# Patient Record
Sex: FEMALE | Race: WHITE | NOT HISPANIC OR LATINO | Employment: FULL TIME | URBAN - METROPOLITAN AREA
[De-identification: names, ages, dates, MRNs, and addresses within clinical notes are randomized per-mention and may not be internally consistent; named-entity substitution may affect disease eponyms.]

---

## 2018-12-06 ENCOUNTER — HOSPITAL ENCOUNTER (EMERGENCY)
Facility: CLINIC | Age: 43
Discharge: HOME | End: 2018-12-06
Attending: EMERGENCY MEDICINE | Admitting: EMERGENCY MEDICINE
Payer: MEDICAID

## 2018-12-06 ENCOUNTER — APPOINTMENT (OUTPATIENT)
Dept: CT IMAGING | Facility: CLINIC | Age: 43
End: 2018-12-06
Attending: EMERGENCY MEDICINE
Payer: MEDICAID

## 2018-12-06 VITALS
DIASTOLIC BLOOD PRESSURE: 80 MMHG | RESPIRATION RATE: 18 BRPM | HEART RATE: 76 BPM | BODY MASS INDEX: 27.66 KG/M2 | WEIGHT: 166 LBS | SYSTOLIC BLOOD PRESSURE: 106 MMHG | OXYGEN SATURATION: 96 % | TEMPERATURE: 98.2 F | HEIGHT: 65 IN

## 2018-12-06 DIAGNOSIS — N83.202 LEFT OVARIAN CYST: ICD-10-CM

## 2018-12-06 DIAGNOSIS — R10.32 ABDOMINAL PAIN, LEFT LOWER QUADRANT: ICD-10-CM

## 2018-12-06 DIAGNOSIS — G44.219 EPISODIC TENSION-TYPE HEADACHE, NOT INTRACTABLE: ICD-10-CM

## 2018-12-06 LAB
ALBUMIN SERPL-MCNC: 2.8 G/DL (ref 3.4–5)
ALBUMIN UR-MCNC: NEGATIVE MG/DL
ALP SERPL-CCNC: 60 U/L (ref 40–150)
ALT SERPL W P-5'-P-CCNC: 50 U/L (ref 0–50)
ANION GAP SERPL CALCULATED.3IONS-SCNC: 4 MMOL/L (ref 3–14)
APPEARANCE UR: CLEAR
AST SERPL W P-5'-P-CCNC: 24 U/L (ref 0–45)
BASOPHILS # BLD AUTO: 0 10E9/L (ref 0–0.2)
BASOPHILS NFR BLD AUTO: 0.2 %
BILIRUB SERPL-MCNC: 0.2 MG/DL (ref 0.2–1.3)
BILIRUB UR QL STRIP: NEGATIVE
BUN SERPL-MCNC: 14 MG/DL (ref 7–30)
CALCIUM SERPL-MCNC: 8 MG/DL (ref 8.5–10.1)
CHLORIDE SERPL-SCNC: 105 MMOL/L (ref 94–109)
CO2 SERPL-SCNC: 31 MMOL/L (ref 20–32)
COLOR UR AUTO: YELLOW
CREAT SERPL-MCNC: 0.73 MG/DL (ref 0.52–1.04)
CRP SERPL-MCNC: <2.9 MG/L (ref 0–8)
DIFFERENTIAL METHOD BLD: NORMAL
EOSINOPHIL NFR BLD AUTO: 1.9 %
ERYTHROCYTE [DISTWIDTH] IN BLOOD BY AUTOMATED COUNT: 12.9 % (ref 10–15)
ERYTHROCYTE [SEDIMENTATION RATE] IN BLOOD BY WESTERGREN METHOD: 6 MM/H (ref 0–20)
GFR SERPL CREATININE-BSD FRML MDRD: 87 ML/MIN/1.7M2
GLUCOSE SERPL-MCNC: 92 MG/DL (ref 70–99)
GLUCOSE UR STRIP-MCNC: NEGATIVE MG/DL
HCG UR QL: NEGATIVE
HCT VFR BLD AUTO: 41.9 % (ref 35–47)
HGB BLD-MCNC: 13.7 G/DL (ref 11.7–15.7)
HGB UR QL STRIP: ABNORMAL
IMM GRANULOCYTES # BLD: 0 10E9/L (ref 0–0.4)
IMM GRANULOCYTES NFR BLD: 0.2 %
KETONES UR STRIP-MCNC: NEGATIVE MG/DL
LACTATE BLD-SCNC: 0.6 MMOL/L (ref 0.7–2)
LEUKOCYTE ESTERASE UR QL STRIP: NEGATIVE
LIPASE SERPL-CCNC: 130 U/L (ref 73–393)
LYMPHOCYTES # BLD AUTO: 2.3 10E9/L (ref 0.8–5.3)
LYMPHOCYTES NFR BLD AUTO: 35.7 %
MCH RBC QN AUTO: 30.3 PG (ref 26.5–33)
MCHC RBC AUTO-ENTMCNC: 32.7 G/DL (ref 31.5–36.5)
MCV RBC AUTO: 93 FL (ref 78–100)
MONOCYTES # BLD AUTO: 0.4 10E9/L (ref 0–1.3)
MONOCYTES NFR BLD AUTO: 6.5 %
MUCOUS THREADS #/AREA URNS LPF: PRESENT /LPF
NEUTROPHILS # BLD AUTO: 3.5 10E9/L (ref 1.6–8.3)
NEUTROPHILS NFR BLD AUTO: 55.5 %
NITRATE UR QL: NEGATIVE
NRBC # BLD AUTO: 0 10*3/UL
NRBC BLD AUTO-RTO: 0 /100
PH UR STRIP: 5 PH (ref 5–7)
PLATELET # BLD AUTO: 163 10E9/L (ref 150–450)
POTASSIUM SERPL-SCNC: 3.9 MMOL/L (ref 3.4–5.3)
PROT SERPL-MCNC: 5.8 G/DL (ref 6.8–8.8)
RBC # BLD AUTO: 4.52 10E12/L (ref 3.8–5.2)
RBC #/AREA URNS AUTO: 7 /HPF (ref 0–2)
SODIUM SERPL-SCNC: 140 MMOL/L (ref 133–144)
SOURCE: ABNORMAL
SP GR UR STRIP: 1.02 (ref 1–1.03)
SQUAMOUS #/AREA URNS AUTO: 1 /HPF (ref 0–1)
UROBILINOGEN UR STRIP-MCNC: 0 MG/DL (ref 0–2)
WBC # BLD AUTO: 6.3 10E9/L (ref 4–11)
WBC #/AREA URNS AUTO: 2 /HPF (ref 0–5)

## 2018-12-06 PROCEDURE — 85652 RBC SED RATE AUTOMATED: CPT | Performed by: EMERGENCY MEDICINE

## 2018-12-06 PROCEDURE — 99285 EMERGENCY DEPT VISIT HI MDM: CPT | Mod: 25 | Performed by: EMERGENCY MEDICINE

## 2018-12-06 PROCEDURE — 96361 HYDRATE IV INFUSION ADD-ON: CPT | Mod: 59 | Performed by: EMERGENCY MEDICINE

## 2018-12-06 PROCEDURE — 81025 URINE PREGNANCY TEST: CPT | Performed by: EMERGENCY MEDICINE

## 2018-12-06 PROCEDURE — 86140 C-REACTIVE PROTEIN: CPT | Performed by: EMERGENCY MEDICINE

## 2018-12-06 PROCEDURE — 25000128 H RX IP 250 OP 636: Performed by: EMERGENCY MEDICINE

## 2018-12-06 PROCEDURE — 96375 TX/PRO/DX INJ NEW DRUG ADDON: CPT | Performed by: EMERGENCY MEDICINE

## 2018-12-06 PROCEDURE — 81001 URINALYSIS AUTO W/SCOPE: CPT | Performed by: EMERGENCY MEDICINE

## 2018-12-06 PROCEDURE — 74177 CT ABD & PELVIS W/CONTRAST: CPT

## 2018-12-06 PROCEDURE — 83605 ASSAY OF LACTIC ACID: CPT | Performed by: EMERGENCY MEDICINE

## 2018-12-06 PROCEDURE — 36415 COLL VENOUS BLD VENIPUNCTURE: CPT | Performed by: EMERGENCY MEDICINE

## 2018-12-06 PROCEDURE — 25000125 ZZHC RX 250: Performed by: EMERGENCY MEDICINE

## 2018-12-06 PROCEDURE — 99284 EMERGENCY DEPT VISIT MOD MDM: CPT | Mod: Z6 | Performed by: EMERGENCY MEDICINE

## 2018-12-06 PROCEDURE — 96374 THER/PROPH/DIAG INJ IV PUSH: CPT | Mod: 59 | Performed by: EMERGENCY MEDICINE

## 2018-12-06 PROCEDURE — 80053 COMPREHEN METABOLIC PANEL: CPT | Performed by: EMERGENCY MEDICINE

## 2018-12-06 PROCEDURE — 85025 COMPLETE CBC W/AUTO DIFF WBC: CPT | Performed by: EMERGENCY MEDICINE

## 2018-12-06 PROCEDURE — 83690 ASSAY OF LIPASE: CPT | Performed by: EMERGENCY MEDICINE

## 2018-12-06 RX ORDER — ONDANSETRON 2 MG/ML
4 INJECTION INTRAMUSCULAR; INTRAVENOUS EVERY 30 MIN PRN
Status: DISCONTINUED | OUTPATIENT
Start: 2018-12-06 | End: 2018-12-07 | Stop reason: HOSPADM

## 2018-12-06 RX ORDER — IOPAMIDOL 755 MG/ML
100 INJECTION, SOLUTION INTRAVASCULAR ONCE
Status: COMPLETED | OUTPATIENT
Start: 2018-12-06 | End: 2018-12-06

## 2018-12-06 RX ORDER — SODIUM CHLORIDE 9 MG/ML
1000 INJECTION, SOLUTION INTRAVENOUS CONTINUOUS
Status: DISCONTINUED | OUTPATIENT
Start: 2018-12-06 | End: 2018-12-07 | Stop reason: HOSPADM

## 2018-12-06 RX ORDER — KETOROLAC TROMETHAMINE 30 MG/ML
30 INJECTION, SOLUTION INTRAMUSCULAR; INTRAVENOUS ONCE
Status: COMPLETED | OUTPATIENT
Start: 2018-12-06 | End: 2018-12-06

## 2018-12-06 RX ADMIN — ONDANSETRON HYDROCHLORIDE 4 MG: 2 INJECTION, SOLUTION INTRAMUSCULAR; INTRAVENOUS at 19:19

## 2018-12-06 RX ADMIN — KETOROLAC TROMETHAMINE 30 MG: 30 INJECTION, SOLUTION INTRAMUSCULAR at 19:35

## 2018-12-06 RX ADMIN — SODIUM CHLORIDE 1000 ML: 9 INJECTION, SOLUTION INTRAVENOUS at 19:51

## 2018-12-06 RX ADMIN — SODIUM CHLORIDE 60 ML: 9 INJECTION, SOLUTION INTRAVENOUS at 21:14

## 2018-12-06 RX ADMIN — IOPAMIDOL 81 ML: 755 INJECTION, SOLUTION INTRAVENOUS at 21:13

## 2018-12-06 ASSESSMENT — ENCOUNTER SYMPTOMS
HEADACHES: 1
ABDOMINAL PAIN: 1
BLOOD IN STOOL: 1
NAUSEA: 1

## 2018-12-06 NOTE — ED AVS SNAPSHOT
[facility] Emergency Department    [address] [place] [address]    Phone:  [phone]    Fax:  [phone]                                       Alexa Vee   MRN: [id number]    Department:  [facility] Emergency Department   Date of Visit:  12/6/2018           After Visit Summary Signature Page     I have received my discharge instructions, and my questions have been answered. I have discussed any challenges I see with this plan with the nurse or doctor.    ..........................................................................................................................................  Patient/Patient Representative Signature      ..........................................................................................................................................  Patient Representative Print Name and Relationship to Patient    ..................................................               ................................................  Date                                   Time    ..........................................................................................................................................  Reviewed by Signature/Title    ...................................................              ..............................................  Date                                               Time          22EPIC Rev 08/18

## 2018-12-06 NOTE — ED AVS SNAPSHOT
[facility] Emergency Department    [address] [place] [address]    Phone:  [phone]    Fax:  [phone]                                       Alexa Vee   MRN: [id number]    Department:  [facility] Emergency Department   Date of Visit:  12/6/2018           Patient Information     Date Of Birth          1975        Your diagnoses for this visit were:     Left ovarian cyst     Abdominal pain, left lower quadrant     Episodic tension-type headache, not intractable        You were seen by Jose David Tadeo MD.      Follow-up Information     Follow up with Jayro Mckinney MD.    Specialty:  Urology    Why:  As needed    Contact information:    [facility] UROLOGY  [address] [address]  [phone]          Discharge Instructions         Abdominal Pain    Abdominal pain is pain in the stomach or belly area. Everyone has this pain from time to time. In many cases it goes away on its own. But abdominal pain can sometimes be due to a serious problem, such as appendicitis. So it s important to know when to seek help.  Causes of abdominal pain  There are many possible causes of abdominal pain. Common causes in adults include:    Constipation, diarrhea, or gas    Stomach acid flowing back up into the esophagus (acid reflux or heartburn)    Severe acid reflux, called GERD (gastroesophageal reflux disease)    A sore in the lining of the stomach or small intestine (peptic ulcer)    Inflammation of the gallbladder, liver, or pancreas    Gallstones or kidney stones    Appendicitis     Intestinal blockage     An internal organ pushing through a muscle or other tissue (hernia)    Urinary tract infections    In women, menstrual cramps, fibroids, or endometriosis    Inflammation or infection of the intestines  Diagnosing the cause of abdominal pain  Your healthcare provider will do a physical exam help find the cause of your pain. If needed, tests will be  ordered. Belly pain has many possible causes. So it can be hard to find the reason for your pain. Giving details about your pain can help. Tell your provider where and when you feel the pain, and what makes it better or worse. Also let your provider know if you have other symptoms such as:    Fever    Tiredness    Upset stomach (nausea)    Vomiting    Changes in bathroom habits  Treating abdominal pain  Some causes of pain need emergency medical treatment right away. These include appendicitis or a bowel blockage. Other problems can be treated with rest, fluids, or medicines. Your healthcare provider can give you specific instructions for treatment or self-care based on what is causing your pain.  If you have vomiting or diarrhea, sip water or other clear fluids. When you are ready to eat solid foods again, start with small amounts of easy-to-digest, low-fat foods. These include apple sauce, toast, or crackers.   When to seek medical care  Call 911 or go to the hospital right away if you:    Can t pass stool and are vomiting    Are vomiting blood or have bloody diarrhea or black, tarry diarrhea    Have chest, neck, or shoulder pain    Feel like you might pass out    Have pain in your shoulder blades with nausea    Have sudden, severe belly pain    Have new, severe pain unlike any you have felt before    Have a belly that is rigid, hard, and tender to touch  Call your healthcare provider if you have:    Pain for more than 5 days    Bloating for more than 2 days    Diarrhea for more than 5 days    A fever of 100.4 F (38 C) or higher, or as directed by your healthcare provider    Pain that gets worse    Weight loss for no reason    Continued lack of appetite    Blood in your stool  How to prevent abdominal pain  Here are some tips to help prevent abdominal pain:    Eat smaller amounts of food at one time.    Avoid greasy, fried, or other high-fat foods.    Avoid foods that give you gas.    Exercise regularly.    Drink  plenty of fluids.  To help prevent GERD symptoms:    Quit smoking.    Reduce alcohol and certain foods that increase stomach acid.    Avoid aspirin and over-the-counter pain and fever medicines (NSAIDS or nonsteroidal anti-inflammatory drugs), if possible    Lose extra weight.    Finish eating at least 2 hours before you go to bed or lie down.    Raise the head of your bed.  Date Last Reviewed: 7/1/2016 2000-2018 [employer]. [address] [place] [address], [place] [address]. All rights reserved. This information is not intended as a substitute for professional medical care. Always follow your healthcare professional's instructions.          Discharge References/Attachments     OVARIAN CYSTS, UNDERSTANDING (ENGLISH)    OVARIAN CYSTS, TREATMENT FOR  (ENGLISH)    HEADACHE, TENSION (ENGLISH)      24 Hour Appointment Hotline       To make an appointment at any [facility] clinic, call [phone] ([phone]). If you don't have a family doctor or clinic, we will help you find one. [facility] clinics are conveniently located to serve the needs of you and your family.             Review of your medicines      Our records show that you are taking the medicines listed below. If these are incorrect, please call your family doctor or clinic.        Dose / Directions Last dose taken    venlafaxine 37.5 MG 24 hr capsule   Commonly known as:  EFFEXOR-XR   Dose:  37.5 mg        Take 37.5 mg by mouth daily   Refills:  0                Procedures and tests performed during your visit     CBC with platelets differential    CRP inflammation    CT Abdomen Pelvis w Contrast    Comprehensive metabolic panel    Erythrocyte sedimentation rate auto    Give 20 ounces of water 15 minutes before CT of abdomen    HCG qualitative urine    Lactic acid whole blood    Lipase    Peripheral IV catheter    UA reflex to Microscopic      Orders Needing Specimen Collection     None      Pending Results     Date and Time Order Name Status  "Description    2018 1858 CT Abdomen Pelvis w Contrast Preliminary             Pending Culture Results     No orders found from 2018 to 2018.            Pending Results Instructions     If you had any lab results that were not finalized at the time of your Discharge, you can call the ED Lab Result RN at [phone]. You will be contacted by this team for any positive Lab results or changes in treatment. The nurses are available 7 days a week from 10A to 6:30P.  You can leave a message 24 hours per day and they will return your call.        Thank you for choosing [facility]       Thank you for choosing [facility] for your care. Our goal is always to provide you with excellent care. Hearing back from our patients is one way we can continue to improve our services. Please take a few minutes to complete the written survey that you may receive in the mail after you visit with us. Thank you!        [employer] Information     [employer] lets you send messages to your doctor, view your test results, renew your prescriptions, schedule appointments and more. To sign up, go to [email or web address] . Click on \"Log in\" on the left side of the screen, which will take you to the Welcome page. Then click on \"Sign up Now\" on the right side of the page.     You will be asked to enter the access code listed below, as well as some personal information. Please follow the directions to create your username and password.     Your access code is: [email or web address]  Expires: 3/6/2019 10:01 PM     Your access code will  in 90 days. If you need help or a new code, please call your [facility] clinic or [phone].        Care EveryWhere ID     This is your Care EveryWhere ID. This could be used by other organizations to access your [facility] medical records  [id number]        Equal Access to Services     MARIANELA MATOS AH: carolin Martinez, edwin mendiola " ah. So [facility] [phone].    ATENCIÓN: Si habla español, tiene a arzate disposición servicios gratuitos de asistencia lingüística. Llame al [phone].    We comply with applicable federal civil rights laws and [place] laws. We do not discriminate on the basis of race, color, national origin, age, disability, sex, sexual orientation, or gender identity.            After Visit Summary       This is your record. Keep this with you and show to your community pharmacist(s) and doctor(s) at your next visit.

## 2018-12-07 NOTE — ED TRIAGE NOTES
Pt here with left lower quad pain, blood in her stool, nausea, and headache. She says she had recent cystoscopy that she did not do follow-up on.

## 2018-12-07 NOTE — ED PROVIDER NOTES
History     Chief Complaint   Patient presents with     Abdominal Pain     The history is provided by the patient, the spouse and medical records.     Alexa Vee is a 43 year old female who presents to the ED for evaluation of left lower quadrant pain, blood in her stools, nausea, headaches, and a multitude of other ailments.  Patient has been bouncing around different ERs over the last month for a variety of things including methamphetamine abuse, left lower quadrant abdominal pain, blood in her urine, blood in her stool, and has failed to show up to any of her follow-up appointments because she is without insurance currently.  Patient had a cystoscopy and August that showed no evidence for intracystic abnormalities.  At that time she also had a CT of the abdomen pelvis showing a 5 cm ovarian cyst and a ill-defined left adnexal mass.  At the time she had a negative pregnancy test and again she was lost to follow-up.    Problem List:    Patient Active Problem List    Diagnosis Date Noted     CARDIOVASCULAR SCREENING; LDL GOAL LESS THAN 130 06/26/2012     Priority: Medium        Past Medical History:    No past medical history on file.    Past Surgical History:    No past surgical history on file.    Family History:    Family History   Problem Relation Age of Onset     Cerebrovascular Disease Mother      Hypertension Mother      Cerebrovascular Disease Maternal Grandmother      Cerebrovascular Disease Maternal Grandfather      Hypertension Maternal Aunt        Social History:  Marital Status:   [4]  Social History   Substance Use Topics     Smoking status: Current Every Day Smoker     Packs/day: 0.50     Smokeless tobacco: Never Used     Alcohol use No        Medications:      venlafaxine (EFFEXOR-XR) 37.5 MG 24 hr capsule         Review of Systems   Gastrointestinal: Positive for abdominal pain, blood in stool and nausea.   Neurological: Positive for headaches.       Physical Exam   BP: 122/78  Pulse:  "76  Heart Rate: 76  Temp: 97.6  F (36.4  C)  Resp: 14  Height: 163.8 cm (5' 4.5\")  Weight: 75.3 kg (166 lb)  SpO2: 100 %      Physical Exam   Constitutional: She is oriented to person, place, and time. She appears well-developed. No distress.   HENT:   Head: Atraumatic.   Mouth/Throat: Oropharynx is clear and moist.   Eyes: EOM are normal. Pupils are equal, round, and reactive to light.   Neck: Normal range of motion. Neck supple.   Cardiovascular: Normal rate, normal heart sounds and intact distal pulses.    Pulmonary/Chest: Effort normal and breath sounds normal.   Abdominal: Soft. Bowel sounds are normal. There is tenderness (Left lower quadrant tenderness to palpation).   No CVA tenderness to percussion   Musculoskeletal: Normal range of motion.   Neurological: She is alert and oriented to person, place, and time.   Skin: Skin is warm.   Psychiatric: She has a normal mood and affect.   Nursing note and vitals reviewed.      ED Course     ED Course     Procedures               Results for orders placed or performed during the hospital encounter of 12/06/18 (from the past 24 hour(s))   UA reflex to Microscopic   Result Value Ref Range    Color Urine Yellow     Appearance Urine Clear     Glucose Urine Negative NEG^Negative mg/dL    Bilirubin Urine Negative NEG^Negative    Ketones Urine Negative NEG^Negative mg/dL    Specific Gravity Urine 1.019 1.003 - 1.035    Blood Urine Moderate (A) NEG^Negative    pH Urine 5.0 5.0 - 7.0 pH    Protein Albumin Urine Negative NEG^Negative mg/dL    Urobilinogen mg/dL 0.0 0.0 - 2.0 mg/dL    Nitrite Urine Negative NEG^Negative    Leukocyte Esterase Urine Negative NEG^Negative    Source Midstream Urine     RBC Urine 7 (H) 0 - 2 /HPF    WBC Urine 2 0 - 5 /HPF    Squamous Epithelial /HPF Urine 1 0 - 1 /HPF    Mucous Urine Present (A) NEG^Negative /LPF   HCG qualitative urine   Result Value Ref Range    HCG Qual Urine Negative NEG^Negative   CBC with platelets differential   Result Value " Ref Range    WBC 6.3 4.0 - 11.0 10e9/L    RBC Count 4.52 3.8 - 5.2 10e12/L    Hemoglobin 13.7 11.7 - 15.7 g/dL    Hematocrit 41.9 35.0 - 47.0 %    MCV 93 78 - 100 fl    MCH 30.3 26.5 - 33.0 pg    MCHC 32.7 31.5 - 36.5 g/dL    RDW 12.9 10.0 - 15.0 %    Platelet Count 163 150 - 450 10e9/L    Diff Method Automated Method     % Neutrophils 55.5 %    % Lymphocytes 35.7 %    % Monocytes 6.5 %    % Eosinophils 1.9 %    % Basophils 0.2 %    % Immature Granulocytes 0.2 %    Nucleated RBCs 0 0 /100    Absolute Neutrophil 3.5 1.6 - 8.3 10e9/L    Absolute Lymphocytes 2.3 0.8 - 5.3 10e9/L    Absolute Monocytes 0.4 0.0 - 1.3 10e9/L    Absolute Basophils 0.0 0.0 - 0.2 10e9/L    Abs Immature Granulocytes 0.0 0 - 0.4 10e9/L    Absolute Nucleated RBC 0.0    Comprehensive metabolic panel   Result Value Ref Range    Sodium 140 133 - 144 mmol/L    Potassium 3.9 3.4 - 5.3 mmol/L    Chloride 105 94 - 109 mmol/L    Carbon Dioxide 31 20 - 32 mmol/L    Anion Gap 4 3 - 14 mmol/L    Glucose 92 70 - 99 mg/dL    Urea Nitrogen 14 7 - 30 mg/dL    Creatinine 0.73 0.52 - 1.04 mg/dL    GFR Estimate 87 >60 mL/min/1.7m2    GFR Estimate If Black >90 >60 mL/min/1.7m2    Calcium 8.0 (L) 8.5 - 10.1 mg/dL    Bilirubin Total 0.2 0.2 - 1.3 mg/dL    Albumin 2.8 (L) 3.4 - 5.0 g/dL    Protein Total 5.8 (L) 6.8 - 8.8 g/dL    Alkaline Phosphatase 60 40 - 150 U/L    ALT 50 0 - 50 U/L    AST 24 0 - 45 U/L   CRP inflammation   Result Value Ref Range    CRP Inflammation <2.9 0.0 - 8.0 mg/L   Erythrocyte sedimentation rate auto   Result Value Ref Range    Sed Rate 6 0 - 20 mm/h   Lactic acid whole blood   Result Value Ref Range    Lactic Acid 0.6 (L) 0.7 - 2.0 mmol/L   Lipase   Result Value Ref Range    Lipase 130 73 - 393 U/L   CT Abdomen Pelvis w Contrast    Narrative    CT ABDOMEN AND PELVIS WITH CONTRAST   12/6/2018 9:25 PM     HISTORY: Left lower quadrant pain.    TECHNIQUE: CT abdomen and pelvis with 81 mL IsoVue 370 IV. Radiation  dose for this scan was reduced  using automated exposure control,  adjustment of the mA and/or kV according to patient size, or iterative  reconstruction technique.    COMPARISON: None.    FINDINGS:   Moderate stool throughout the colon. No acute inflammatory change of  the bowel is identified. No bowel obstruction. No abscess or free air.  IUD in the central uterus identified. There is a small left ovarian  cyst that could be a functional cyst that is 1.8 cm image 60. The  appendix appears normal.    Cholecystectomy. The liver, adrenals, spleen, and pancreas show no  acute abnormalities. Vascular calcifications.      Impression    IMPRESSION:  1. No acute abnormality is seen.  2. Small cyst that could just be a functional cyst at the left ovary.       Medications   ondansetron (ZOFRAN) injection 4 mg (4 mg Intravenous Given 12/6/18 1919)   0.9% sodium chloride BOLUS (0 mLs Intravenous Stopped 12/6/18 2117)     Followed by   sodium chloride 0.9% infusion (not administered)   ketorolac (TORADOL) injection 30 mg (30 mg Intravenous Given 12/6/18 1935)   iopamidol (ISOVUE-370) solution 100 mL (81 mLs Intravenous Given 12/6/18 2113)   sodium chloride 0.9 % bag 500mL for CT scan flush use (60 mLs Intravenous Given 12/6/18 2114)   sodium chloride (PF) 0.9% PF flush 3 mL (3 mLs Intravenous Given 12/6/18 2114)       Assessments & Plan (with Medical Decision Making)  Alexa Vee is a 43-year-old female presenting to the ED with a multitude of complaints including left lower quadrant pain, blood in her stool, nausea, headache, and not feeling well.  Reviewing her chart, it appears that she is been bouncing around a number of emergency rooms with similar complaints and undergoing workups but failing to follow through with follow-ups.  This may be partly due to the fact that she is uninsured.  In reviewing her chart it looks like the left lower quadrant pain is been going on for a considerable amount of time.  She had a CT of the abdomen and pelvis in August  showing a left adnexal mass and left 5 cm ovarian cyst.  At that time she also had a cystoscopy which was unremarkable for any abnormalities.  She was to follow-up after that hospital ER visit but failed to do so.  There are additional episodes where this is occurred as well.  This is partly due to the fact that she is uninsured but also raises the suspicion that she may have some seeking behavior.  The patient does have a strong history of methamphetamine abuse.  Her exam today again subjectively has some left lower quadrant tenderness.  She was receptive to getting Toradol for the pain and her headache.  There is still undertones of some drug-seeking behavior though she was being discharged asking about what pain medicine I was going to send her home with after spending 5 minutes talking to her about using over-the-counter ibuprofen or Aleve to control her pain.  Workup today has been unremarkable except for a small left ovarian cyst measuring 1.8 cm.  I do not see evidence for the left adnexal mass or any other intra-abdominal abnormality.  At this time given her normal labs and workup, I believe that we can safely send her home with follow-up with her primary care provider.  All questions from the patient and  were answered prior to her discharge.     I have reviewed the nursing notes.    I have reviewed the findings, diagnosis, plan and need for follow up with the patient.       New Prescriptions    No medications on file       Final diagnoses:   Left ovarian cyst   Abdominal pain, left lower quadrant   Episodic tension-type headache, not intractable       12/6/2018   [facility] EMERGENCY DEPARTMENT     Jose David Tadeo MD  12/06/18 4230

## 2018-12-07 NOTE — DISCHARGE INSTRUCTIONS

## 2019-01-13 ENCOUNTER — OFFICE VISIT (OUTPATIENT)
Dept: URGENT CARE | Facility: URGENT CARE | Age: 44
End: 2019-01-13
Payer: COMMERCIAL

## 2019-01-13 VITALS
OXYGEN SATURATION: 99 % | DIASTOLIC BLOOD PRESSURE: 80 MMHG | TEMPERATURE: 98.2 F | BODY MASS INDEX: 27.21 KG/M2 | SYSTOLIC BLOOD PRESSURE: 117 MMHG | RESPIRATION RATE: 18 BRPM | WEIGHT: 161 LBS | HEART RATE: 74 BPM

## 2019-01-13 DIAGNOSIS — L73.8 HOT TUB FOLLICULITIS: ICD-10-CM

## 2019-01-13 DIAGNOSIS — L50.9 HIVES: Primary | ICD-10-CM

## 2019-01-13 PROCEDURE — 99213 OFFICE O/P EST LOW 20 MIN: CPT | Performed by: PHYSICIAN ASSISTANT

## 2019-01-13 RX ORDER — FAMOTIDINE 20 MG/1
20 TABLET, FILM COATED ORAL
COMMUNITY
Start: 2019-01-09 | End: 2022-03-11

## 2019-01-13 RX ORDER — VENLAFAXINE HYDROCHLORIDE 75 MG/1
75 CAPSULE, EXTENDED RELEASE ORAL
COMMUNITY
Start: 2019-01-10 | End: 2022-10-13

## 2019-01-13 RX ORDER — OLANZAPINE 5 MG/1
5 TABLET ORAL
COMMUNITY
Start: 2019-01-09 | End: 2022-03-11

## 2019-01-13 RX ORDER — IBUPROFEN 600 MG/1
600 TABLET, FILM COATED ORAL
COMMUNITY
Start: 2019-01-09

## 2019-01-13 RX ORDER — PREDNISONE 20 MG/1
20 TABLET ORAL 2 TIMES DAILY
Qty: 10 TABLET | Refills: 0 | Status: SHIPPED | OUTPATIENT
Start: 2019-01-13 | End: 2019-01-18

## 2019-01-13 RX ORDER — GABAPENTIN 100 MG/1
100 CAPSULE ORAL
COMMUNITY
Start: 2019-01-09 | End: 2022-03-11

## 2019-01-13 RX ORDER — CEPHALEXIN 500 MG/1
500 CAPSULE ORAL 3 TIMES DAILY
Qty: 21 CAPSULE | Refills: 0 | Status: SHIPPED | OUTPATIENT
Start: 2019-01-13 | End: 2019-01-20
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
## 2019-01-13 NOTE — PATIENT INSTRUCTIONS
Benadryl or Zyrtec  Patient Education     Hives (Adult)  Hives are pink or red bumps on the skin. These bumps are also known as wheals. The bumps can itch, burn, or sting. Hives can occur anywhere on the body. They vary in size and shape and can form in clusters. Individual hives can appear and go away quickly. New hives may develop as old ones fade. Hives are common and usually harmless. Occasionally hives are a sign of a serious allergy.  Hives are often caused by an allergic reaction. It may be an allergic reaction to foods such as fruit, shellfish, chocolate, nuts, or tomatoes. It may be a reaction to pollens, animal fur, or mold spores. Medicines, chemicals, and insect bites can also cause hives. And hives can be caused by hot sun or cold air. The cause of hives can be difficult to find.  You may be given medicines to relieve swelling and itching. Follow all instructions when using these medicines. The hives will usually fade in a few days, but can last up to 2 weeks.  Home care  Follow these tips:    Try to find the cause of the hives and eliminate it. Discuss possible causes with your healthcare provider. Future reactions to the same allergen may be worse.    Don t scratch the hives. Scratching will delay healing. To reduce itching, apply cool, wet compresses to the skin.    Dress in soft, loose cotton clothing.    Don t bathe in hot water. This can make the itching worse.    Apply an ice pack or cool pack wrapped in a thin towel to your skin. This will help reduce redness and itching. But if your hives were caused by exposure to cold, then do not apply more cold to them.    You may use over-the counter antihistamines to reduce itching. Some older antihistamines, such as diphenhydramine and chlorpheniramine, are inexpensive. But they need to be taken often and may make you sleepy. They are best used at bedtime. Don t use diphenhydramine if you have glaucoma or have trouble urinating because of an enlarged  prostate. Newer antihistamines, such as loratadine, cetirizine, and fexofenadine, are generally more expensive. But they tend to have fewer side effects, such as drowsiness. They can be taken less often.    Another type of antihistamine is used to treat heartburn. This type includes ranitidine, nizatidine, famotidine, and cimetidine. These are sometimes used along with the above antihistamines if a single medicine is not working.  Follow-up care  Follow up with your healthcare provider if your symptoms don't get better in 2 days. Ask your provider about allergy testing if you have had a severe reaction, or have had several episodes of hives. He or she can use the allergy testing to find out what you are allergic to.  When to seek medical advice  Call your healthcare provider right away if any of these occur:    Fever of 100.4 F (38.0 C) or higher, or as directed by your healthcare provider    Redness, swelling, or pain    Foul-smelling fluid coming from the rash  Call 911  Call 911 if any of the following occur:    Swelling of the face, throat, or tongue    Trouble breathing or swallowing    Dizziness, weakness, or fainting  Date Last Reviewed: 9/1/2016 2000-2018 [employer]. [address] [place] [address], [place] [address]. All rights reserved. This information is not intended as a substitute for professional medical care. Always follow your healthcare professional's instructions.

## 2019-01-15 ENCOUNTER — APPOINTMENT (OUTPATIENT)
Dept: OPTOMETRY | Facility: CLINIC | Age: 44
End: 2019-01-15
Payer: COMMERCIAL

## 2019-01-15 PROCEDURE — 92340 FIT SPECTACLES MONOFOCAL: CPT | Performed by: OPTOMETRIST

## 2022-03-11 ENCOUNTER — OFFICE VISIT (OUTPATIENT)
Dept: OBGYN | Facility: CLINIC | Age: 47
End: 2022-03-11
Payer: COMMERCIAL

## 2022-03-11 VITALS
RESPIRATION RATE: 16 BRPM | TEMPERATURE: 97.1 F | SYSTOLIC BLOOD PRESSURE: 127 MMHG | BODY MASS INDEX: 29.81 KG/M2 | HEART RATE: 102 BPM | DIASTOLIC BLOOD PRESSURE: 89 MMHG | WEIGHT: 162 LBS | HEIGHT: 62 IN

## 2022-03-11 DIAGNOSIS — Z01.419 ENCOUNTER FOR GYNECOLOGICAL EXAMINATION WITHOUT ABNORMAL FINDING: Primary | ICD-10-CM

## 2022-03-11 DIAGNOSIS — Z00.00 ROUTINE GENERAL MEDICAL EXAMINATION AT A HEALTH CARE FACILITY: ICD-10-CM

## 2022-03-11 DIAGNOSIS — N89.8 VAGINAL DISCHARGE: ICD-10-CM

## 2022-03-11 LAB
CHOLEST SERPL-MCNC: 179 MG/DL
CLUE CELLS: ABNORMAL
FASTING STATUS PATIENT QL REPORTED: NO
HBA1C MFR BLD: 5.4 % (ref 0–5.6)
HBV SURFACE AG SERPL QL IA: NONREACTIVE
HCV AB SERPL QL IA: NONREACTIVE
HDLC SERPL-MCNC: 77 MG/DL
HIV 1+2 AB+HIV1 P24 AG SERPL QL IA: NONREACTIVE
LDLC SERPL CALC-MCNC: 86 MG/DL
NONHDLC SERPL-MCNC: 102 MG/DL
T PALLIDUM AB SER QL: NONREACTIVE
TRICHOMONAS, WET PREP: ABNORMAL
TRIGL SERPL-MCNC: 81 MG/DL
WBC'S/HIGH POWER FIELD, WET PREP: ABNORMAL
YEAST, WET PREP: ABNORMAL

## 2022-03-11 PROCEDURE — 86780 TREPONEMA PALLIDUM: CPT | Performed by: OBSTETRICS & GYNECOLOGY

## 2022-03-11 PROCEDURE — 80061 LIPID PANEL: CPT | Performed by: OBSTETRICS & GYNECOLOGY

## 2022-03-11 PROCEDURE — G0145 SCR C/V CYTO,THINLAYER,RESCR: HCPCS | Performed by: OBSTETRICS & GYNECOLOGY

## 2022-03-11 PROCEDURE — 87624 HPV HI-RISK TYP POOLED RSLT: CPT | Performed by: OBSTETRICS & GYNECOLOGY

## 2022-03-11 PROCEDURE — 87340 HEPATITIS B SURFACE AG IA: CPT | Performed by: OBSTETRICS & GYNECOLOGY

## 2022-03-11 PROCEDURE — 87210 SMEAR WET MOUNT SALINE/INK: CPT | Performed by: OBSTETRICS & GYNECOLOGY

## 2022-03-11 PROCEDURE — 87389 HIV-1 AG W/HIV-1&-2 AB AG IA: CPT | Performed by: OBSTETRICS & GYNECOLOGY

## 2022-03-11 PROCEDURE — 87591 N.GONORRHOEAE DNA AMP PROB: CPT | Performed by: OBSTETRICS & GYNECOLOGY

## 2022-03-11 PROCEDURE — 83036 HEMOGLOBIN GLYCOSYLATED A1C: CPT | Performed by: OBSTETRICS & GYNECOLOGY

## 2022-03-11 PROCEDURE — 36415 COLL VENOUS BLD VENIPUNCTURE: CPT | Performed by: OBSTETRICS & GYNECOLOGY

## 2022-03-11 PROCEDURE — 87491 CHLMYD TRACH DNA AMP PROBE: CPT | Performed by: OBSTETRICS & GYNECOLOGY

## 2022-03-11 PROCEDURE — 86803 HEPATITIS C AB TEST: CPT | Performed by: OBSTETRICS & GYNECOLOGY

## 2022-03-11 PROCEDURE — 99213 OFFICE O/P EST LOW 20 MIN: CPT | Mod: 25 | Performed by: OBSTETRICS & GYNECOLOGY

## 2022-03-11 PROCEDURE — 99386 PREV VISIT NEW AGE 40-64: CPT | Performed by: OBSTETRICS & GYNECOLOGY

## 2022-03-11 RX ORDER — BUPROPION HYDROCHLORIDE 150 MG/1
TABLET ORAL
COMMUNITY
Start: 2021-05-14 | End: 2023-09-30

## 2022-03-11 RX ORDER — ARIPIPRAZOLE 15 MG/1
15 TABLET ORAL
COMMUNITY
Start: 2021-05-14

## 2022-03-11 RX ORDER — CLONIDINE HYDROCHLORIDE 0.1 MG/1
TABLET ORAL
COMMUNITY
Start: 2021-04-29

## 2022-03-11 NOTE — NURSING NOTE
"Initial /89 (BP Location: Right arm, Patient Position: Chair, Cuff Size: Adult Regular)   Pulse 102   Temp 97.1  F (36.2  C) (Tympanic)   Resp 16   Ht 1.575 m (5' 2\")   Wt 73.5 kg (162 lb)   Breastfeeding No   BMI 29.63 kg/m   Estimated body mass index is 29.63 kg/m  as calculated from the following:    Height as of this encounter: 1.575 m (5' 2\").    Weight as of this encounter: 73.5 kg (162 lb). .    Brittany Kay CMA    "
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
## 2022-03-12 LAB
C TRACH DNA SPEC QL PROBE+SIG AMP: NEGATIVE
N GONORRHOEA DNA SPEC QL NAA+PROBE: NEGATIVE

## 2022-03-13 RX ORDER — CLINDAMYCIN PHOSPHATE 10 MG/G
GEL TOPICAL 2 TIMES DAILY
Qty: 60 G | Refills: 0 | Status: SHIPPED | OUTPATIENT
Start: 2022-03-13 | End: 2022-03-18

## 2022-03-15 ENCOUNTER — HOSPITAL ENCOUNTER (OUTPATIENT)
Facility: CLINIC | Age: 47
End: 2022-03-15
Attending: SURGERY | Admitting: SURGERY
Payer: COMMERCIAL

## 2022-03-15 ENCOUNTER — TELEPHONE (OUTPATIENT)
Dept: OBGYN | Facility: CLINIC | Age: 47
End: 2022-03-15
Payer: COMMERCIAL

## 2022-03-15 DIAGNOSIS — Z11.59 ENCOUNTER FOR SCREENING FOR OTHER VIRAL DISEASES: Primary | ICD-10-CM

## 2022-03-15 LAB
BKR LAB AP GYN ADEQUACY: NORMAL
BKR LAB AP GYN INTERPRETATION: NORMAL
BKR LAB AP HPV REFLEX: NORMAL
BKR LAB AP PREVIOUS ABNORMAL: NORMAL
PATH REPORT.COMMENTS IMP SPEC: NORMAL
PATH REPORT.COMMENTS IMP SPEC: NORMAL
PATH REPORT.RELEVANT HX SPEC: NORMAL

## 2022-03-15 NOTE — TELEPHONE ENCOUNTER
Pt called for the followin.  Recent lab results.  2.  Pap results  3.  Wants IUD appt to remove and replace.    Britta Hernández RN

## 2022-03-15 NOTE — TELEPHONE ENCOUNTER
Left message for patient on unidentifable line to call back re: phone message.    Laurie Riojas RN on 3/15/2022 at 9:22 AM

## 2022-03-16 LAB
HUMAN PAPILLOMA VIRUS 16 DNA: NEGATIVE
HUMAN PAPILLOMA VIRUS 18 DNA: NEGATIVE
HUMAN PAPILLOMA VIRUS FINAL DIAGNOSIS: NORMAL
HUMAN PAPILLOMA VIRUS OTHER HR: NEGATIVE

## 2022-04-06 ENCOUNTER — HOSPITAL ENCOUNTER (EMERGENCY)
Facility: CLINIC | Age: 47
Discharge: HOME | End: 2022-04-06
Attending: EMERGENCY MEDICINE | Admitting: EMERGENCY MEDICINE
Payer: COMMERCIAL

## 2022-04-06 VITALS
RESPIRATION RATE: 16 BRPM | WEIGHT: 170 LBS | DIASTOLIC BLOOD PRESSURE: 84 MMHG | OXYGEN SATURATION: 100 % | BODY MASS INDEX: 31.09 KG/M2 | SYSTOLIC BLOOD PRESSURE: 129 MMHG | TEMPERATURE: 98.1 F | HEART RATE: 100 BPM

## 2022-04-06 DIAGNOSIS — K08.89 PAIN, DENTAL: ICD-10-CM

## 2022-04-06 DIAGNOSIS — K12.0 APHTHOUS ULCER: ICD-10-CM

## 2022-04-06 PROCEDURE — 99284 EMERGENCY DEPT VISIT MOD MDM: CPT | Performed by: EMERGENCY MEDICINE

## 2022-04-06 RX ORDER — CLINDAMYCIN HCL 300 MG
300 CAPSULE ORAL 3 TIMES DAILY
Qty: 30 CAPSULE | Refills: 0 | Status: SHIPPED | OUTPATIENT
Start: 2022-04-06 | End: 2022-04-16

## 2022-04-06 RX ORDER — TRIAMCINOLONE ACETONIDE 0.1 %
PASTE (GRAM) DENTAL 2 TIMES DAILY
Qty: 5 G | Refills: 0 | Status: SHIPPED | OUTPATIENT
Start: 2022-04-06

## 2022-04-06 NOTE — ED TRIAGE NOTES
"Patient presents with concerns for \"blisters and sores\" in her mouth off and on for a couple months. She reports seeing a dentist already. She reports lots of bruising on her legs and is concerned for a more serious issue. Michaela Veliz RN  "

## 2022-04-06 NOTE — DISCHARGE INSTRUCTIONS
You do have a small ulcer at the base of the tooth.  This may be a viral ulcer.  Apply the ointment as directed.  In addition I will cover you with antibiotics in case you have a infection at the base of that tooth.

## 2022-04-06 NOTE — ED PROVIDER NOTES
History     Chief Complaint   Patient presents with     Mouth Lesions     Bleeding/Bruising     HPI  Alexa Vee is a 46 year old female who presents with complaints of dental infection.  Patient states she is already seen a dentist for this issue.  She thinks she needs a tooth pulled.  No fever or chills.  She thinks she has some enlarged lymph nodes.  She had some pressure in her ears but denies ear drainage.  She is concerned about tenderness behind the ears.  No fever chills.  No difficulty speech or swallowing.  Denies any sinus drainage.  No facial pain.  Has had previous dental extractions.  No treatment for symptoms prior to arrival.    Allergies:  Allergies   Allergen Reactions     Amoxicillin      Hydrocodone-Acetaminophen Hives     Hydromorphone Hives       Problem List:    Patient Active Problem List    Diagnosis Date Noted     CARDIOVASCULAR SCREENING; LDL GOAL LESS THAN 130 06/26/2012     Priority: Medium        Past Medical History:    No past medical history on file.    Past Surgical History:    No past surgical history on file.    Family History:    Family History   Problem Relation Age of Onset     Cerebrovascular Disease Mother      Hypertension Mother      Cerebrovascular Disease Maternal Grandmother      Cerebrovascular Disease Maternal Grandfather      Hypertension Maternal Aunt        Social History:  Marital Status:   [4]  Social History     Tobacco Use     Smoking status: Current Every Day Smoker     Packs/day: 0.50     Types: Cigarettes     Smokeless tobacco: Never Used   Vaping Use     Vaping Use: Never used   Substance Use Topics     Alcohol use: Yes     Comment: rare     Drug use: Not Currently     Types: Methamphetamines        Medications:    clindamycin (CLEOCIN) 300 MG capsule  triamcinolone (KENALOG) 0.1 % paste  ARIPiprazole (ABILIFY) 15 MG tablet  buPROPion (WELLBUTRIN XL) 150 MG 24 hr tablet  cloNIDine (CATAPRES) 0.1 MG tablet  ibuprofen (ADVIL/MOTRIN) 600 MG  tablet  levonorgestrel (MIRENA) 20 MCG/24HR IUD  venlafaxine (EFFEXOR-XR) 75 MG 24 hr capsule          Review of Systems all other systems are reviewed and are negative.    Physical Exam   BP: 129/84  Pulse: 100  Temp: 98.1  F (36.7  C)  Resp: 16  Weight: 77.1 kg (170 lb)  SpO2: 100 %      Physical Exam presentation patient has no obvious facial or neck swelling.  Her speech is clear.  She has no trismus or malocclusion.  She is missing multiple teeth from previous extractions.  At the base of the #9 tooth there is a what appears to be aphthous ulcer.  No blisters or drainage.  Tender but nonfluctuant.  The gumline is nontender.  Neck is supple without adenopathy.  Patient's ears were clear.  She had no radicular adenopathy and no erythema or tenderness over the mastoid.  Neck was supple with tenderness of the strap and trapezius muscles bilaterally.    ED Course                 Procedures              Critical Care time:  none               No results found for this or any previous visit (from the past 24 hour(s)).    Medications - No data to display    Assessments & Plan (with Medical Decision Making)   Alexaedgar Vee is a 46 year old female who presents with complaints of dental infection.  Patient states she is already seen a dentist for this issue.  She thinks she needs a tooth pulled.  No fever or chills.  She thinks she has some enlarged lymph nodes.  She had some pressure in her ears but denies ear drainage.  She is concerned about tenderness behind the ears.  No fever chills.  No difficulty speech or swallowing.  Denies any sinus drainage.  No facial pain.  Has had previous dental extractions.  No treatment for symptoms prior to arrival.  On exam patient had no obvious swelling of the neck or face.  She had no trismus or malocclusion.  Did have a ulcer at the base #9 tooth.  This is tender.  Nonfluctuant.  No sinus drainage.  Ears are normal.  No mastoid tenderness or redness.  No cervical adenopathy.   Could be an aphthous ulcer so she will try triamcinolone ointment.  We will also cover her for a apical infection with clindamycin which worked in the past.  She can follow-up with her dentist if persistent symptoms.  I have reviewed the nursing notes.    I have reviewed the findings, diagnosis, plan and need for follow up with the patient.       New Prescriptions    CLINDAMYCIN (CLEOCIN) 300 MG CAPSULE    Take 1 capsule (300 mg) by mouth 3 times daily for 10 days    TRIAMCINOLONE (KENALOG) 0.1 % PASTE    Take by mouth 2 times daily       Final diagnoses:   Aphthous ulcer   Pain, dental       4/6/2022   [facility] EMERGENCY DEPT     Suresh Mccoy MD  04/06/22 [id number]

## 2022-04-13 ENCOUNTER — ANESTHESIA EVENT (OUTPATIENT)
Dept: GASTROENTEROLOGY | Facility: CLINIC | Age: 47
End: 2022-04-13
Payer: COMMERCIAL

## 2022-04-13 ASSESSMENT — LIFESTYLE VARIABLES: TOBACCO_USE: 1

## 2022-04-13 NOTE — ANESTHESIA PREPROCEDURE EVALUATION
Anesthesia Pre-Procedure Evaluation    Patient: Alexa Vee   MRN: [id number] : 1975        Procedure : Procedure(s):  COLONOSCOPY          No past medical history on file.   No past surgical history on file.   Allergies   Allergen Reactions     Amoxicillin      Hydrocodone-Acetaminophen Hives     Hydromorphone Hives      Social History     Tobacco Use     Smoking status: Current Every Day Smoker     Packs/day: 0.50     Types: Cigarettes     Smokeless tobacco: Never Used   Substance Use Topics     Alcohol use: Yes     Comment: rare      Wt Readings from Last 1 Encounters:   22 77.1 kg (170 lb)        Anesthesia Evaluation            ROS/MED HX  ENT/Pulmonary:     (+) tobacco use, Current use,     Neurologic:       Cardiovascular:       METS/Exercise Tolerance:     Hematologic:       Musculoskeletal:       GI/Hepatic:       Renal/Genitourinary:       Endo:       Psychiatric/Substance Use:       Infectious Disease:       Malignancy:       Other:               OUTSIDE LABS:  CBC:   Lab Results   Component Value Date    WBC 6.3 2018    HGB 13.7 2018    HCT 41.9 2018     2018     BMP:   Lab Results   Component Value Date     2018    POTASSIUM 3.9 2018    CHLORIDE 105 2018    CO2 31 2018    BUN 14 2018    CR 0.73 2018    GLC 92 2018     COAGS: No results found for: PTT, INR, FIBR  POC:   Lab Results   Component Value Date    HCG Negative 2018     HEPATIC:   Lab Results   Component Value Date    ALBUMIN 2.8 (L) 2018    PROTTOTAL 5.8 (L) 2018    ALT 50 2018    AST 24 2018    ALKPHOS 60 2018    BILITOTAL 0.2 2018     OTHER:   Lab Results   Component Value Date    LACT 0.6 (L) 2018    A1C 5.4 2022    EMMETT 8.0 (L) 2018    LIPASE 130 2018    CRP <2.9 2018    SED 6 2018               Kaiden Kirk, CRNA, APRN CRNA

## 2022-04-17 RX ORDER — SODIUM CHLORIDE, SODIUM LACTATE, POTASSIUM CHLORIDE, CALCIUM CHLORIDE 600; 310; 30; 20 MG/100ML; MG/100ML; MG/100ML; MG/100ML
INJECTION, SOLUTION INTRAVENOUS CONTINUOUS
Status: CANCELLED | OUTPATIENT
Start: 2022-04-17

## 2022-04-17 RX ORDER — LIDOCAINE 40 MG/G
CREAM TOPICAL
Status: CANCELLED | OUTPATIENT
Start: 2022-04-17

## 2022-04-18 ENCOUNTER — ANESTHESIA (OUTPATIENT)
Dept: GASTROENTEROLOGY | Facility: CLINIC | Age: 47
End: 2022-04-18
Payer: COMMERCIAL

## 2022-04-27 ENCOUNTER — HOSPITAL ENCOUNTER (OUTPATIENT)
Dept: MAMMOGRAPHY | Facility: CLINIC | Age: 47
Discharge: HOME | End: 2022-04-27
Attending: OBSTETRICS & GYNECOLOGY | Admitting: OBSTETRICS & GYNECOLOGY
Payer: COMMERCIAL

## 2022-04-27 DIAGNOSIS — Z01.419 ENCOUNTER FOR GYNECOLOGICAL EXAMINATION WITHOUT ABNORMAL FINDING: ICD-10-CM

## 2022-04-27 PROCEDURE — 77067 SCR MAMMO BI INCL CAD: CPT

## 2022-09-19 ENCOUNTER — TELEPHONE (OUTPATIENT)
Dept: GASTROENTEROLOGY | Facility: CLINIC | Age: 47
End: 2022-09-19

## 2022-09-19 NOTE — TELEPHONE ENCOUNTER
Screening Questions  BLUE  KIND OF PREP RED  LOCATION [review exclusion criteria] GREEN  SEDATION TYPE        N Are you active on mychart?       gELHAUS Ordering/Referring Provider?        UCARE What type of coverage do you have?      N Have you had a positive covid test in the last 90 days?     1.  BMI 31.1  [BMI 40+ - review exclusion criteria]    2. Y  Are you able to give consent for your medical care? [IF NO,RN REVIEW]        3. N  Are you taking any prescription pain medications on a routine schedule?        3a.  EXTENDED PREP What kind of prescription?   4. N Do you have any chemical dependencies such as alcohol, street drugs, or methadone?    5.  Y ANXIETY AND PSYCHOSISDo you have any history of post-traumatic stress syndrome, severe anxiety or history of psychosis?      **If yes 3- 5 , please schedule with MAC sedation.**    BMI OVER 40 NEED PAC EVALUATION FOR [facility]          IF YES TO ANY 6 - 10 - HOSPITAL SETTING ONLY.     6.   N Do you need assistance transferring?     7.   N Have you had a heart or lung transplant?    8.   N Are you currently on dialysis?   9.   N Do you use daily home oxygen?   10. N Do you take nitroglycerin?   10a.  If yes, how often?     11. [FEMALES]   Are you currently pregnant?    11a.  If yes, how many weeks? [ Greater than 12 weeks, OR NEEDED]    12. N Do you have Pulmonary Hypertension? *NEED PAC APPT AT [facility]*     13. N [review exclusion criteria]  Do you have any implantable devices in your body (pacemaker, defib, LVAD)?    14. N In the past 6 months, have you had any heart related issues including cardiomyopathy or heart attack?     14a.  If yes, did it require cardiac stenting if so when?     15. N Have you had a stroke or Transient ischemic attack (TIA - aka  mini stroke ) within 6 months?      16. N Do you have mod to severe Obstructive Sleep Apnea?  [Hospital only - Ok at [facility]]    17. N Do you have SEVERE AND UNCONTROLLED asthma? *NEED PAC APPT AT [facility]*     18.  "N Are you currently taking any blood thinners?     18a. If yes, inform patient to \"follow up w/ ordering provider for bridging instructions.\"    19. N Do you take the medication Phentermine?    19a. If yes, \"Hold for 7 days before procedure.  Please consult your prescribing provider if you have questions about holding this medication.\"     20. N  Do you have chronic kidney disease?      21. N  Do you have a diagnosis of diabetes?     22. Y  On a regular basis do you go 3-5 days between bowel movements?     23. N Preferred LOCAL Pharmacy for Pre Prescription    [ LIST ONLY ONE PHARMACY]          [id number] - [place], [place] - [address]        - CLOSING REMINDERS -    Informed patient they will need an adult    Cannot take any type of public or medical transportation alone    Conscious Sedation- Needs  for 6 hours after the procedure       MAC/General-Needs  for 24 hours after procedure    Pre-Procedure Covid test to be completed [[facility] PCR Testing Required]    Confirmed Nurse will call to complete assessment       - SCHEDULING DETAILS -     Long  Surgeon    11/16/22  Date of Procedure  Lower Endoscopy [Colonoscopy]  Type of Procedure Scheduled   [facility]If you answer yes to questions #8, #20, #21Which Colonoscopy Prep was Sent?     mac Sedation Type     Pre-op with pcp PAC / Pre-op Required         Additional comments:            "

## 2022-09-29 PROBLEM — F60.9 PERSONALITY DISORDER (H): Status: ACTIVE | Noted: 2019-06-23

## 2022-09-29 PROBLEM — F39 EPISODIC MOOD DISORDER (H): Status: ACTIVE | Noted: 2019-07-23

## 2022-09-29 PROBLEM — F41.0 PANIC DISORDER: Status: ACTIVE | Noted: 2018-01-24

## 2022-09-29 PROBLEM — F41.1 GENERALIZED ANXIETY DISORDER: Status: ACTIVE | Noted: 2018-03-02

## 2022-09-29 PROBLEM — F31.64 SEVERE MIXED BIPOLAR I DISORDER WITH PSYCHOTIC FEATURES (H): Status: ACTIVE | Noted: 2019-07-19

## 2022-09-29 PROBLEM — F15.20 METHAMPHETAMINE USE DISORDER, SEVERE, DEPENDENCE (H): Status: ACTIVE | Noted: 2019-01-08

## 2022-09-29 PROBLEM — F43.10 PTSD (POST-TRAUMATIC STRESS DISORDER): Status: ACTIVE | Noted: 2019-07-19

## 2022-09-29 PROBLEM — F31.81 BIPOLAR 2 DISORDER (H): Status: ACTIVE | Noted: 2018-03-02

## 2022-10-13 ENCOUNTER — TELEPHONE (OUTPATIENT)
Dept: FAMILY MEDICINE | Facility: CLINIC | Age: 47
End: 2022-10-13

## 2022-10-13 ENCOUNTER — OFFICE VISIT (OUTPATIENT)
Dept: FAMILY MEDICINE | Facility: CLINIC | Age: 47
End: 2022-10-13
Payer: COMMERCIAL

## 2022-10-13 VITALS
OXYGEN SATURATION: 100 % | BODY MASS INDEX: 31.09 KG/M2 | SYSTOLIC BLOOD PRESSURE: 118 MMHG | DIASTOLIC BLOOD PRESSURE: 62 MMHG | WEIGHT: 170 LBS | TEMPERATURE: 97.3 F | HEART RATE: 95 BPM

## 2022-10-13 DIAGNOSIS — Z30.432 ENCOUNTER FOR IUD REMOVAL: Primary | ICD-10-CM

## 2022-10-13 DIAGNOSIS — Z12.11 SCREEN FOR COLON CANCER: ICD-10-CM

## 2022-10-13 PROCEDURE — 58301 REMOVE INTRAUTERINE DEVICE: CPT | Performed by: NURSE PRACTITIONER

## 2022-10-13 NOTE — PROGRESS NOTES
Assessment & Plan     Encounter for IUD removal  Done today with no concerns  - REMOVE INTRAUTERINE DEVICE  BC discussed     Screen for colon cancer  Due. Patient has upcoming colonoscopy scheduled    Follow up with GYN if IUD placement desired in the future again.     Call or return to the clinic with any worsening of symptoms or no resolution. Patient/Parent verbalized understanding and is in agreement. Medication side effects reviewed.   Current Outpatient Medications   Medication Sig Dispense Refill     ARIPiprazole (ABILIFY) 15 MG tablet Take 15 mg by mouth       buPROPion (WELLBUTRIN XL) 150 MG 24 hr tablet TAKE ONE TABLET BY MOUTH EVERY MORNING FOR MOOD       buPROPion (WELLBUTRIN XL) 300 MG 24 hr tablet Take 300 mg by mouth every morning       cloNIDine (CATAPRES) 0.1 MG tablet TAKE ONE-HALF TABLET BY MOUTH TWO TIMES A DAY(MORNING AND AFTERNOON)       ibuprofen (ADVIL/MOTRIN) 600 MG tablet Take 600 mg by mouth       levonorgestrel (MIRENA) 20 MCG/24HR IUD 1 Device by Intrauterine route       prazosin (MINIPRESS) 1 MG capsule TAKE 1-2 CAPSULES BY MOUTH 30 MINUTES BEFORE BEDTIME AS NEEDED FOR NIGHTMARES       triamcinolone (KENALOG) 0.1 % paste Take by mouth 2 times daily 5 g 0     Chart documentation with Dragon Voice recognition Software. Although reviewed after completion, some words and grammatical errors may remain.    KEENAN Botello CNP  [facility]    Marlen Liu is a 47 year old, presenting for the following health issues:  IUD      IUD    History of Present Illness       Reason for visit:  Removal of iud    She eats 0-1 servings of fruits and vegetables daily.She consumes 2 sweetened beverage(s) daily.She exercises with enough effort to increase her heart rate 10 to 19 minutes per day.  She exercises with enough effort to increase her heart rate 3 or less days per week. She is missing 1 dose(s) of medications per week.     Night sweats intermittent  No  period for this last month  Mild periods.. .getting irregular  IUD mirena wants to have taking out today   Was using for period regulation  Not able to feel her strings.     No ibuprofen before coming    Mental health has been stable on current meds  Sleep has been good  Immunizations offered and declined today  Pneumo, TDAP, FLUE, COVID  Due for colorectal cancer screening  Has colonoscopy set up for November.    No results found for: Valeria Ellis MD Ordering Provider:  Ordering Location: [facility]  Collected: 03/11/2022 09:19 AM  First Screen: Britney Lanza CT  (ASCP)  Received: 03/11/2022 10:10 AM  .  Specimens  A Pap Imaged Thinlayer Screening, Cervix  .  Interpretation  Negative for Intraepithelial Lesion or Malignancy (NILM)  Electronically signed by Britney Lanza CT (ASCP) on 3/15/2022 at 10:00 AM  .  Comment  Papanicolaou Test Limitations: Cervical cytology is a screening test with limited sensitivity, and regular screening is critical for  cancer prevention. Pap tests are primarily effective for the diagnosis/prevention of squamous cell carcinoma, not adenocarcinoma  or other cancers.  Specimen Adequacy  Satisfactory for evaluation, endocervical/transformation zone component absent     Collected 3/11/2022  9:19 AM      Status: Final result      Visible to patient: No (inaccessible in MyChart)      Dx: Encounter for gynecological examinati...      1 Result Note     1 HM Topic   Component Ref Range & Units     Other HR HPV Negative Negative     HPV16 DNA Negative Negative     HPV18 DNA Negative Negative     FINAL DIAGNOSIS     This patient's sample is negative for HPV DNA.            Review of Systems   Constitutional, HEENT, cardiovascular, pulmonary, GI, , musculoskeletal, neuro, skin, endocrine and psych systems are negative, except as otherwise noted.      Objective    /62   Pulse 95   Temp 97.3  F (36.3  C)   Wt 77.1 kg  (170 lb)   LMP 09/21/2022   SpO2 100%   BMI 31.09 kg/m    Body mass index is 31.09 kg/m .  Physical Exam   GENERAL: healthy, alert and no distress  EYES: Eyes grossly normal to inspection, PERRL and conjunctivae and sclerae normal  HENT: ear canals and TM's normal, nose and mouth without ulcers or lesions  NECK: no adenopathy, no asymmetry, masses, or scars and thyroid normal to palpation  RESP: lungs clear to auscultation - no rales, rhonchi or wheezes  BREAST: normal without masses, tenderness or nipple discharge and no palpable axillary masses or adenopathy  CV: regular rate and rhythm, normal S1 S2, no S3 or S4, no murmur, click or rub, no peripheral edema and peripheral pulses strong  ABDOMEN: soft, nontender, no hepatosplenomegaly, no masses and bowel sounds normal   (female): normal female external genitalia, normal urethral meatus, vaginal mucosa pink, moist, well rugated, and normal cervix/adnexa/uterus without masses or discharge IUD strings identified and IUD removed with ring foreceps using sterile technique intact with no difficulty   MS: no gross musculoskeletal defects noted, no edema  SKIN: no suspicious lesions or rashes  NEURO: Normal strength and tone, mentation intact and speech normal  PSYCH: mentation appears normal, affect normal/bright    Office Visit on 03/11/2022   Component Date Value Ref Range Status     Interpretation 03/11/2022 Negative for Intraepithelial Lesion or Malignancy (NILM)    Final     Comment 03/11/2022    Final                    Value:This result contains rich text formatting which cannot be displayed here.     Specimen Adequacy 03/11/2022 Satisfactory for evaluation, endocervical/transformation zone component absent   Final     Clinical Information 03/11/2022    Final                    Value:This result contains rich text formatting which cannot be displayed here.     Reflex Testing 03/11/2022 Yes regardless of result   Final     Previous Abnormal? 03/11/2022     Final                    Value:This result contains rich text formatting which cannot be displayed here.     Performing Labs 03/11/2022    Final                    Value:This result contains rich text formatting which cannot be displayed here.     HIV Antigen Antibody Combo 03/11/2022 Nonreactive  Nonreactive Final    HIV-1 p24 Ag & HIV-1/HIV-2 Ab Not Detected     Treponema Antibody Total 03/11/2022 Nonreactive  Nonreactive Final     Hepatitis B Surface Antigen 03/11/2022 Nonreactive  Nonreactive Final     Chlamydia Trachomatis 03/11/2022 Negative  Negative Final    Negative for C. trachomatis rRNA by transcription mediated amplification.   A negative result by transcription mediated amplification does not preclude the presence of infection because results are dependent on proper and adequate collection, absence of inhibitors and sufficient rRNA to be detected.     Neisseria gonorrhoeae 03/11/2022 Negative  Negative Final    Negative for N. gonorrhoeae rRNA by transcription mediated amplification. A negative result by transcription mediated amplification does not preclude the presence of C. trachomatis infection because results are dependent on proper and adequate collection, absence of inhibitors and sufficient rRNA to be detected.     Trichomonas 03/11/2022 Absent  Absent Final     Yeast 03/11/2022 Absent  Absent Final     Clue Cells 03/11/2022 Absent  Absent Final     WBCs/high power field 03/11/2022 1+ (A)  None Final     Hemoglobin A1C 03/11/2022 5.4  0.0 - 5.6 % Final    Normal <5.7%   Prediabetes 5.7-6.4%    Diabetes 6.5% or higher     Note: Adopted from ADA consensus guidelines.     Hepatitis C Antibody 03/11/2022 Nonreactive  Nonreactive Final     Cholesterol 03/11/2022 179  <200 mg/dL Final     Triglycerides 03/11/2022 81  <150 mg/dL Final     Direct Measure HDL 03/11/2022 77  >=50 mg/dL Final     LDL Cholesterol Calculated 03/11/2022 86  <=100 mg/dL Final     Non HDL Cholesterol 03/11/2022 102  <130 mg/dL  Final     Patient Fasting > 8hrs? 03/11/2022 No   Final     Other HR HPV 03/11/2022 Negative  Negative Final     HPV16 DNA 03/11/2022 Negative  Negative Final     HPV18 DNA 03/11/2022 Negative  Negative Final     FINAL DIAGNOSIS 03/11/2022    Final                    Value:This result contains rich text formatting which cannot be displayed here.

## 2022-10-13 NOTE — TELEPHONE ENCOUNTER
Pt was scheduled with Dr. Farrell today for IUD removal/ replacement.   States is reaching max limit time for IUD- expires 12/2022, not sure if wants to replace. Took work off today for appt, requesting appt today if possible.  Scheduled today with Katie Melgoza NP, for removal.  Pt will plan to F/U with OB/ gyn for further consult.  RAYMOND Olmos RN

## 2022-11-15 NOTE — H&P
[facility] Anesthesia Pre-op History and Physical    Alexa Vee MRN# [id number]   Age: 47 year old YOB: 1975      Date of Surgery: 11/16/2022 Location [facility]      Date of Exam 11/16/2022 Facility (Same day)       Home clinic: [facility]  Primary care provider: No Ref-Primary, Physician         Chief Complaint and/or Reason for Procedure:   No chief complaint on file.  Screening colonoscopy         Active problem list:     Patient Active Problem List    Diagnosis Date Noted     Episodic mood disorder (H) 07/23/2019     Priority: Medium     PTSD (post-traumatic stress disorder) 07/19/2019     Priority: Medium     Severe mixed bipolar I disorder with psychotic features (H) 07/19/2019     Priority: Medium     Personality disorder (H) 06/23/2019     Priority: Medium     Methamphetamine use disorder, severe, dependence (H) 01/08/2019     Priority: Medium     Formatting of this note might be different from the original.  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       Bipolar 2 disorder (H) 03/02/2018     Priority: Medium     Generalized anxiety disorder 03/02/2018     Priority: Medium     Panic disorder 01/24/2018     Priority: Medium     CARDIOVASCULAR SCREENING; LDL GOAL LESS THAN 130 06/26/2012     Priority: Medium            Medications (include herbals and vitamins):   Any Plavix use in the last 7 days? No     No current facility-administered medications for this encounter.     Current Outpatient Medications   Medication Sig     ARIPiprazole (ABILIFY) 15 MG tablet Take 15 mg by mouth     bisacodyl (DULCOLAX) 5 MG EC tablet Take 2 tablets at 3 pm the day before your procedure. If your procedure is before 11 am, take 2 additional tablets at 11 pm. If your procedure is after 11 am, take 2 additional tablets at 6 am. For additional instructions refer to your colonoscopy prep instructions.     buPROPion (WELLBUTRIN XL) 150  MG 24 hr tablet TAKE ONE TABLET BY MOUTH EVERY MORNING FOR MOOD     buPROPion (WELLBUTRIN XL) 300 MG 24 hr tablet Take 300 mg by mouth every morning     cloNIDine (CATAPRES) 0.1 MG tablet TAKE ONE-HALF TABLET BY MOUTH TWO TIMES A DAY(MORNING AND AFTERNOON)     ibuprofen (ADVIL/MOTRIN) 600 MG tablet Take 600 mg by mouth     levonorgestrel (MIRENA) 20 MCG/24HR IUD 1 Device by Intrauterine route     polyethylene glycol (GOLYTELY) 236 g suspension The night before the exam at 6 pm drink an 8-ounce glass every 15 minutes until the jug is half empty. If you arrive before 11 AM: Drink the other half of the Golytely jug at 11 PM night before procedure. If you arrive after 11 AM: Drink the other half of the Golytely jug at 6 AM day of procedure. For additional instructions refer to your colonoscopy prep instructions.     prazosin (MINIPRESS) 1 MG capsule TAKE 1-2 CAPSULES BY MOUTH 30 MINUTES BEFORE BEDTIME AS NEEDED FOR NIGHTMARES     triamcinolone (KENALOG) 0.1 % paste Take by mouth 2 times daily             Allergies:      Allergies   Allergen Reactions     Amoxicillin      Hydrocodone-Acetaminophen Hives     Hydromorphone Hives     Allergy to Latex? No  Allergy to tape?   No  Intolerances:             Physical Exam:   All vitals have been reviewed  No data found.  No intake/output data recorded.  Lungs:   No increased work of breathing, good air exchange, clear to auscultation bilaterally, no crackles or wheezing     Cardiovascular:   Normal apical impulse, regular rate and rhythm, normal S1 and S2, no S3 or S4, and no murmur noted             Lab / Radiology Results:            Anesthetic risk and/or ASA classification:       Wilmer Farris MD

## 2022-11-16 ENCOUNTER — HOSPITAL ENCOUNTER (OUTPATIENT)
Facility: CLINIC | Age: 47
Discharge: HOME | End: 2022-11-16
Attending: INTERNAL MEDICINE | Admitting: INTERNAL MEDICINE
Payer: COMMERCIAL

## 2022-11-16 ENCOUNTER — ANESTHESIA (OUTPATIENT)
Dept: GASTROENTEROLOGY | Facility: CLINIC | Age: 47
End: 2022-11-16
Payer: COMMERCIAL

## 2022-11-16 ENCOUNTER — ANESTHESIA EVENT (OUTPATIENT)
Dept: GASTROENTEROLOGY | Facility: CLINIC | Age: 47
End: 2022-11-16
Payer: COMMERCIAL

## 2022-11-16 VITALS — OXYGEN SATURATION: 98 % | DIASTOLIC BLOOD PRESSURE: 85 MMHG | HEART RATE: 85 BPM | SYSTOLIC BLOOD PRESSURE: 121 MMHG

## 2022-11-16 LAB
COLONOSCOPY: NORMAL
SARS-COV-2 RNA RESP QL NAA+PROBE: NEGATIVE

## 2022-11-16 PROCEDURE — 258N000003 HC RX IP 258 OP 636: Performed by: NURSE ANESTHETIST, CERTIFIED REGISTERED

## 2022-11-16 PROCEDURE — 370N000017 HC ANESTHESIA TECHNICAL FEE, PER MIN: Performed by: INTERNAL MEDICINE

## 2022-11-16 PROCEDURE — U0003 INFECTIOUS AGENT DETECTION BY NUCLEIC ACID (DNA OR RNA); SEVERE ACUTE RESPIRATORY SYNDROME CORONAVIRUS 2 (SARS-COV-2) (CORONAVIRUS DISEASE [COVID-19]), AMPLIFIED PROBE TECHNIQUE, MAKING USE OF HIGH THROUGHPUT TECHNOLOGIES AS DESCRIBED BY CMS-2020-01-R: HCPCS | Performed by: INTERNAL MEDICINE

## 2022-11-16 PROCEDURE — 250N000009 HC RX 250: Performed by: NURSE ANESTHETIST, CERTIFIED REGISTERED

## 2022-11-16 PROCEDURE — G0121 COLON CA SCRN NOT HI RSK IND: HCPCS | Performed by: INTERNAL MEDICINE

## 2022-11-16 PROCEDURE — 250N000011 HC RX IP 250 OP 636: Performed by: NURSE ANESTHETIST, CERTIFIED REGISTERED

## 2022-11-16 PROCEDURE — 45378 DIAGNOSTIC COLONOSCOPY: CPT | Performed by: INTERNAL MEDICINE

## 2022-11-16 RX ORDER — PROPOFOL 10 MG/ML
INJECTION, EMULSION INTRAVENOUS CONTINUOUS PRN
Status: DISCONTINUED | OUTPATIENT
Start: 2022-11-16 | End: 2022-11-16

## 2022-11-16 RX ORDER — LIDOCAINE 40 MG/G
CREAM TOPICAL
Status: DISCONTINUED | OUTPATIENT
Start: 2022-11-16 | End: 2022-11-16 | Stop reason: HOSPADM

## 2022-11-16 RX ORDER — ONDANSETRON 4 MG/1
4 TABLET, ORALLY DISINTEGRATING ORAL EVERY 30 MIN PRN
Status: DISCONTINUED | OUTPATIENT
Start: 2022-11-16 | End: 2022-11-16 | Stop reason: HOSPADM

## 2022-11-16 RX ORDER — SODIUM CHLORIDE, SODIUM LACTATE, POTASSIUM CHLORIDE, CALCIUM CHLORIDE 600; 310; 30; 20 MG/100ML; MG/100ML; MG/100ML; MG/100ML
INJECTION, SOLUTION INTRAVENOUS CONTINUOUS
Status: DISCONTINUED | OUTPATIENT
Start: 2022-11-16 | End: 2022-11-16 | Stop reason: HOSPADM

## 2022-11-16 RX ORDER — LIDOCAINE HYDROCHLORIDE 20 MG/ML
INJECTION, SOLUTION INFILTRATION; PERINEURAL PRN
Status: DISCONTINUED | OUTPATIENT
Start: 2022-11-16 | End: 2022-11-16

## 2022-11-16 RX ORDER — PROPOFOL 10 MG/ML
INJECTION, EMULSION INTRAVENOUS PRN
Status: DISCONTINUED | OUTPATIENT
Start: 2022-11-16 | End: 2022-11-16

## 2022-11-16 RX ORDER — ONDANSETRON 2 MG/ML
4 INJECTION INTRAMUSCULAR; INTRAVENOUS EVERY 30 MIN PRN
Status: DISCONTINUED | OUTPATIENT
Start: 2022-11-16 | End: 2022-11-16 | Stop reason: HOSPADM

## 2022-11-16 RX ADMIN — PROPOFOL 100 MG: 10 INJECTION, EMULSION INTRAVENOUS at 14:35

## 2022-11-16 RX ADMIN — PROPOFOL 150 MCG/KG/MIN: 10 INJECTION, EMULSION INTRAVENOUS at 14:35

## 2022-11-16 RX ADMIN — PROPOFOL 40 MG: 10 INJECTION, EMULSION INTRAVENOUS at 14:40

## 2022-11-16 RX ADMIN — DEXMEDETOMIDINE HYDROCHLORIDE 12 MCG: 100 INJECTION, SOLUTION INTRAVENOUS at 14:30

## 2022-11-16 RX ADMIN — SODIUM CHLORIDE, POTASSIUM CHLORIDE, SODIUM LACTATE AND CALCIUM CHLORIDE: 600; 310; 30; 20 INJECTION, SOLUTION INTRAVENOUS at 14:18

## 2022-11-16 RX ADMIN — LIDOCAINE HYDROCHLORIDE 40 MG: 20 INJECTION, SOLUTION INFILTRATION; PERINEURAL at 14:35

## 2022-11-16 ASSESSMENT — ACTIVITIES OF DAILY LIVING (ADL): ADLS_ACUITY_SCORE: 35

## 2022-11-16 NOTE — ANESTHESIA CARE TRANSFER NOTE
Patient: Alexa Vee    Procedure: Procedure(s):  COLONOSCOPY       Diagnosis: Encounter for gynecological examination without abnormal finding [Z01.419]  Diagnosis Additional Information: No value filed.    Anesthesia Type:   MAC     Note:    Oropharynx: oropharynx clear of all foreign objects and spontaneously breathing  Level of Consciousness: drowsy  Oxygen Supplementation: face mask    Independent Airway: airway patency satisfactory and stable  Dentition: dentition unchanged  Vital Signs Stable: post-procedure vital signs reviewed and stable  Report to RN Given: handoff report given  Patient transferred to: PACU    Handoff Report: Identifed the Patient, Identified the Reponsible Provider, Reviewed the pertinent medical history, Discussed the surgical course, Reviewed Intra-OP anesthesia mangement and issues during anesthesia, Set expectations for post-procedure period and Allowed opportunity for questions and acknowledgement of understanding      Vitals:  Vitals Value Taken Time   /85 11/16/22 1530   Temp     Pulse 85 11/16/22 1530   Resp     SpO2 98 % 11/16/22 1524       Electronically Signed By: KEENAN Gagnon CRNA  November 16, 2022  3:55 PM

## 2022-11-16 NOTE — ANESTHESIA POSTPROCEDURE EVALUATION
Patient: Alexa Vee    Procedure: Procedure(s):  COLONOSCOPY       Anesthesia Type:  MAC    Note:  Disposition: Outpatient   Postop Pain Control: Uneventful            Sign Out: Well controlled pain   PONV: No   Neuro/Psych: Uneventful            Sign Out: Acceptable/Baseline neuro status   Airway/Respiratory: Uneventful            Sign Out: Acceptable/Baseline resp. status   CV/Hemodynamics: Uneventful            Sign Out: Acceptable CV status   Other NRE: NONE   DID A NON-ROUTINE EVENT OCCUR? No    Event details/Postop Comments:  Pt was happy with anesthesia care.  No complications.  I will follow up with the pt if needed.           Last vitals:  Vitals Value Taken Time   /85 11/16/22 1530   Temp     Pulse 85 11/16/22 1530   Resp     SpO2 98 % 11/16/22 1524       Electronically Signed By: KEENAN Gagnon CRNA  November 16, 2022  3:59 PM

## 2022-11-16 NOTE — ANESTHESIA CARE TRANSFER NOTE
Patient: Alexa Vee    Procedure: Procedure(s):  COLONOSCOPY       Diagnosis: Encounter for gynecological examination without abnormal finding [Z01.419]  Diagnosis Additional Information: No value filed.    Anesthesia Type:   MAC     Note:    Oropharynx: oropharynx clear of all foreign objects and spontaneously breathing  Level of Consciousness: drowsy  Oxygen Supplementation: face mask    Independent Airway: airway patency satisfactory and stable  Dentition: dentition unchanged  Vital Signs Stable: post-procedure vital signs reviewed and stable  Report to RN Given: handoff report given  Patient transferred to: Phase II    Handoff Report: Identifed the Patient, Identified the Reponsible Provider, Reviewed the pertinent medical history, Discussed the surgical course, Reviewed Intra-OP anesthesia mangement and issues during anesthesia, Set expectations for post-procedure period and Allowed opportunity for questions and acknowledgement of understanding      Vitals:  Vitals Value Taken Time   BP     Temp     Pulse     Resp     SpO2 99 % 11/16/22 1503   Vitals shown include unvalidated device data.    Electronically Signed By: KEENAN Gagnon CRNA  November 16, 2022  3:04 PM

## 2022-11-16 NOTE — ANESTHESIA PREPROCEDURE EVALUATION
Anesthesia Pre-Procedure Evaluation    Patient: Alexa Vee   MRN: [id number] : 1975        Procedure : Procedure(s):  COLONOSCOPY          History reviewed. No pertinent past medical history.   History reviewed. No pertinent surgical history.   Allergies   Allergen Reactions     Amoxicillin      Hydrocodone-Acetaminophen Hives     Hydromorphone Hives      Social History     Tobacco Use     Smoking status: Every Day     Packs/day: 0.50     Types: Cigarettes     Smokeless tobacco: Never   Substance Use Topics     Alcohol use: Yes     Comment: rare      Wt Readings from Last 1 Encounters:   10/13/22 77.1 kg (170 lb)        Anesthesia Evaluation   Pt has not had prior anesthetic         ROS/MED HX  ENT/Pulmonary:  - neg pulmonary ROS     Neurologic:  - neg neurologic ROS     Cardiovascular:  - neg cardiovascular ROS     METS/Exercise Tolerance:     Hematologic:  - neg hematologic  ROS     Musculoskeletal:  - neg musculoskeletal ROS     GI/Hepatic:  - neg GI/hepatic ROS     Renal/Genitourinary:  - neg Renal ROS     Endo:  - neg endo ROS     Psychiatric/Substance Use:     (+) psychiatric history bipolar H/O chronic opiod use . Recreational drug usage: Meth.    Infectious Disease:  - neg infectious disease ROS     Malignancy:  - neg malignancy ROS     Other:            Physical Exam    Airway        Mallampati: I   TM distance: > 3 FB   Neck ROM: full   Mouth opening: > 3 cm    Respiratory Devices and Support         Dental  no notable dental history         Cardiovascular   cardiovascular exam normal          Pulmonary   pulmonary exam normal                OUTSIDE LABS:  CBC:   Lab Results   Component Value Date    WBC 6.3 2018    HGB 13.7 2018    HCT 41.9 2018     2018     BMP:   Lab Results   Component Value Date     2018    POTASSIUM 3.9 2018    CHLORIDE 105 2018    CO2 31 2018    BUN 14 2018    CR 0.73 2018    GLC 92 2018      COAGS: No results found for: PTT, INR, FIBR  POC:   Lab Results   Component Value Date    HCG Negative 12/06/2018     HEPATIC:   Lab Results   Component Value Date    ALBUMIN 2.8 (L) 12/06/2018    PROTTOTAL 5.8 (L) 12/06/2018    ALT 50 12/06/2018    AST 24 12/06/2018    ALKPHOS 60 12/06/2018    BILITOTAL 0.2 12/06/2018     OTHER:   Lab Results   Component Value Date    LACT 0.6 (L) 12/06/2018    A1C 5.4 03/11/2022    EMMETT 8.0 (L) 12/06/2018    LIPASE 130 12/06/2018    CRP <2.9 12/06/2018    SED 6 12/06/2018       Anesthesia Plan    ASA Status:  1   NPO Status:  NPO Appropriate    Anesthesia Type: MAC.     - Reason for MAC: immobility needed   Induction: Propofol, Intravenous.   Maintenance: TIVA.        Consents    Anesthesia Plan(s) and associated risks, benefits, and realistic alternatives discussed. Questions answered and patient/representative(s) expressed understanding.     - Discussed: Risks, Benefits and Alternatives for BOTH SEDATION and the PROCEDURE were discussed     - Discussed with:  Patient      - Extended Intubation/Ventilatory Support Discussed: No.      - Patient is DNR/DNI Status: No    Use of blood products discussed: No .     Postoperative Care            Comments:    Other Comments: The risks and benefits of anesthesia, and the alternatives where applicable, have been discussed with the patient, and they wish to proceed.               KEENAN Ray CRNA

## 2022-11-16 NOTE — DISCHARGE INSTRUCTIONS
[facility]    Home Care Following Endoscopy          Activity:  You have just undergone an endoscopic procedure usually performed with conscious sedation.  Do not work or operate machinery (including a car) for at least 12 hours.    I encourage you to walk and attempt to pass this air as soon as possible.    Diet:  Return to the diet you were on before your procedure but eat lightly for the first 12-24 hours.  Drink plenty of water.  Resume any regular medications unless otherwise advised by your physician.  Please begin any new medication prescribed as a result of your procedure as directed by your physician.   If you had any biopsy or polyp removed please refrain from aspirin or aspirin products for 2 days.  If on Coumadin please restart as instructed by your physician.   Pain:  You may take Tylenol as needed for pain.  Expected Recovery:  You can expect some mild abdominal fullness and/or discomfort due to the air used to inflate your intestinal tract. It is also normal to have a mild sore throat after upper endoscopy.    Call Your Physician if You Have:  After Colonoscopy:  Worsening persisting abdominal pain which is worse with activity.  Fevers (>101 degrees F), chills or shakes.  Passage of continued blood with bowel movements.   Any questions or concerns about your recovery, please call [phone] or after hours [phone] Nurse Advice Line.

## 2023-09-30 ENCOUNTER — HOSPITAL ENCOUNTER (EMERGENCY)
Facility: CLINIC | Age: 48
Discharge: HOME | End: 2023-10-01
Attending: FAMILY MEDICINE | Admitting: FAMILY MEDICINE
Payer: COMMERCIAL

## 2023-09-30 DIAGNOSIS — F31.81 BIPOLAR 2 DISORDER (H): ICD-10-CM

## 2023-09-30 DIAGNOSIS — F19.20 CHEMICAL DEPENDENCY (H): ICD-10-CM

## 2023-09-30 DIAGNOSIS — R44.0 AUDITORY HALLUCINATION: ICD-10-CM

## 2023-09-30 LAB
ALBUMIN SERPL BCG-MCNC: 3.5 G/DL (ref 3.5–5.2)
ALP SERPL-CCNC: 53 U/L (ref 35–104)
ALT SERPL W P-5'-P-CCNC: 14 U/L (ref 0–50)
ANION GAP SERPL CALCULATED.3IONS-SCNC: 11 MMOL/L (ref 7–15)
APAP SERPL-MCNC: <5 UG/ML (ref 10–30)
AST SERPL W P-5'-P-CCNC: 15 U/L (ref 0–45)
BASOPHILS # BLD AUTO: 0 10E3/UL (ref 0–0.2)
BASOPHILS NFR BLD AUTO: 0 %
BILIRUB SERPL-MCNC: 0.2 MG/DL
BUN SERPL-MCNC: 15.7 MG/DL (ref 6–20)
CALCIUM SERPL-MCNC: 8.9 MG/DL (ref 8.6–10)
CHLORIDE SERPL-SCNC: 103 MMOL/L (ref 98–107)
CREAT SERPL-MCNC: 0.79 MG/DL (ref 0.51–0.95)
DEPRECATED HCO3 PLAS-SCNC: 24 MMOL/L (ref 22–29)
EGFRCR SERPLBLD CKD-EPI 2021: >90 ML/MIN/1.73M2
EOSINOPHIL # BLD AUTO: 0.2 10E3/UL (ref 0–0.7)
EOSINOPHIL NFR BLD AUTO: 3 %
ERYTHROCYTE [DISTWIDTH] IN BLOOD BY AUTOMATED COUNT: 13.5 % (ref 10–15)
GLUCOSE SERPL-MCNC: 98 MG/DL (ref 70–99)
HCT VFR BLD AUTO: 40.2 % (ref 35–47)
HGB BLD-MCNC: 13.5 G/DL (ref 11.7–15.7)
IMM GRANULOCYTES # BLD: 0 10E3/UL
IMM GRANULOCYTES NFR BLD: 0 %
LYMPHOCYTES # BLD AUTO: 2 10E3/UL (ref 0.8–5.3)
LYMPHOCYTES NFR BLD AUTO: 34 %
MCH RBC QN AUTO: 30.3 PG (ref 26.5–33)
MCHC RBC AUTO-ENTMCNC: 33.6 G/DL (ref 31.5–36.5)
MCV RBC AUTO: 90 FL (ref 78–100)
MONOCYTES # BLD AUTO: 0.4 10E3/UL (ref 0–1.3)
MONOCYTES NFR BLD AUTO: 6 %
NEUTROPHILS # BLD AUTO: 3.3 10E3/UL (ref 1.6–8.3)
NEUTROPHILS NFR BLD AUTO: 57 %
NRBC # BLD AUTO: 0 10E3/UL
NRBC BLD AUTO-RTO: 0 /100
PLATELET # BLD AUTO: 180 10E3/UL (ref 150–450)
POTASSIUM SERPL-SCNC: 3.6 MMOL/L (ref 3.4–5.3)
PROT SERPL-MCNC: 5.7 G/DL (ref 6.4–8.3)
RBC # BLD AUTO: 4.46 10E6/UL (ref 3.8–5.2)
SALICYLATES SERPL-MCNC: <0.3 MG/DL
SODIUM SERPL-SCNC: 138 MMOL/L (ref 135–145)
T4 FREE SERPL-MCNC: 0.92 NG/DL (ref 0.9–1.7)
TSH SERPL DL<=0.005 MIU/L-ACNC: 5.63 UIU/ML (ref 0.3–4.2)
WBC # BLD AUTO: 5.9 10E3/UL (ref 4–11)

## 2023-09-30 PROCEDURE — 80143 DRUG ASSAY ACETAMINOPHEN: CPT | Performed by: FAMILY MEDICINE

## 2023-09-30 PROCEDURE — 80051 ELECTROLYTE PANEL: CPT | Performed by: FAMILY MEDICINE

## 2023-09-30 PROCEDURE — 84439 ASSAY OF FREE THYROXINE: CPT | Performed by: FAMILY MEDICINE

## 2023-09-30 PROCEDURE — 99285 EMERGENCY DEPT VISIT HI MDM: CPT | Performed by: FAMILY MEDICINE

## 2023-09-30 PROCEDURE — 85025 COMPLETE CBC W/AUTO DIFF WBC: CPT | Performed by: FAMILY MEDICINE

## 2023-09-30 PROCEDURE — 250N000013 HC RX MED GY IP 250 OP 250 PS 637: Performed by: FAMILY MEDICINE

## 2023-09-30 PROCEDURE — 99284 EMERGENCY DEPT VISIT MOD MDM: CPT | Performed by: FAMILY MEDICINE

## 2023-09-30 PROCEDURE — 84443 ASSAY THYROID STIM HORMONE: CPT | Performed by: FAMILY MEDICINE

## 2023-09-30 PROCEDURE — 80179 DRUG ASSAY SALICYLATE: CPT | Performed by: FAMILY MEDICINE

## 2023-09-30 RX ORDER — LORAZEPAM 1 MG/1
1 TABLET ORAL EVERY 8 HOURS PRN
Status: DISCONTINUED | OUTPATIENT
Start: 2023-09-30 | End: 2023-10-01 | Stop reason: HOSPADM

## 2023-09-30 RX ORDER — HYDROXYZINE HYDROCHLORIDE 25 MG/1
25 TABLET, FILM COATED ORAL
Status: DISCONTINUED | OUTPATIENT
Start: 2023-09-30 | End: 2023-10-01 | Stop reason: HOSPADM

## 2023-09-30 RX ORDER — LANOLIN ALCOHOL/MO/W.PET/CERES
3 CREAM (GRAM) TOPICAL
Status: DISCONTINUED | OUTPATIENT
Start: 2023-09-30 | End: 2023-10-01 | Stop reason: HOSPADM

## 2023-09-30 RX ORDER — CHOLECALCIFEROL (VITAMIN D3) 50 MCG
1 TABLET ORAL DAILY
COMMUNITY
Start: 2023-06-01

## 2023-09-30 RX ORDER — IBUPROFEN 600 MG/1
600 TABLET, FILM COATED ORAL EVERY 6 HOURS PRN
Status: DISCONTINUED | OUTPATIENT
Start: 2023-09-30 | End: 2023-10-01 | Stop reason: HOSPADM

## 2023-09-30 RX ORDER — LISDEXAMFETAMINE DIMESYLATE 30 MG/1
30 CAPSULE ORAL EVERY MORNING
COMMUNITY
Start: 2023-09-20

## 2023-09-30 RX ORDER — OLANZAPINE 10 MG/1
10 TABLET ORAL AT BEDTIME
COMMUNITY
Start: 2023-02-15

## 2023-09-30 RX ORDER — LEVOTHYROXINE SODIUM 150 UG/1
1 TABLET ORAL DAILY
COMMUNITY
Start: 2023-08-09 | End: 2023-10-01

## 2023-09-30 RX ORDER — OLANZAPINE 2.5 MG/1
10 TABLET, FILM COATED ORAL ONCE
Status: COMPLETED | OUTPATIENT
Start: 2023-09-30 | End: 2023-09-30

## 2023-09-30 RX ADMIN — OLANZAPINE 10 MG: 2.5 TABLET, FILM COATED ORAL at 22:06

## 2023-09-30 ASSESSMENT — ACTIVITIES OF DAILY LIVING (ADL)
ADLS_ACUITY_SCORE: 35
ADLS_ACUITY_SCORE: 35

## 2023-10-01 VITALS
RESPIRATION RATE: 18 BRPM | HEART RATE: 84 BPM | OXYGEN SATURATION: 99 % | SYSTOLIC BLOOD PRESSURE: 105 MMHG | DIASTOLIC BLOOD PRESSURE: 74 MMHG | BODY MASS INDEX: 32.47 KG/M2 | WEIGHT: 177.5 LBS | TEMPERATURE: 97.9 F

## 2023-10-01 LAB
ALBUMIN UR-MCNC: NEGATIVE MG/DL
AMPHETAMINES UR QL SCN: ABNORMAL
APPEARANCE UR: ABNORMAL
BACTERIA #/AREA URNS HPF: ABNORMAL /HPF
BARBITURATES UR QL SCN: ABNORMAL
BENZODIAZ UR QL SCN: ABNORMAL
BILIRUB UR QL STRIP: NEGATIVE
BZE UR QL SCN: ABNORMAL
CANNABINOIDS UR QL SCN: ABNORMAL
COLOR UR AUTO: YELLOW
FENTANYL UR QL: ABNORMAL
GLUCOSE UR STRIP-MCNC: NEGATIVE MG/DL
HCG UR QL: NEGATIVE
HGB UR QL STRIP: ABNORMAL
KETONES UR STRIP-MCNC: NEGATIVE MG/DL
LEUKOCYTE ESTERASE UR QL STRIP: NEGATIVE
MUCOUS THREADS #/AREA URNS LPF: PRESENT /LPF
NITRATE UR QL: NEGATIVE
OPIATES UR QL SCN: ABNORMAL
PCP QUAL URINE (ROCHE): ABNORMAL
PH UR STRIP: 6 [PH] (ref 5–7)
RBC URINE: 17 /HPF
SP GR UR STRIP: 1.01 (ref 1–1.03)
SQUAMOUS EPITHELIAL: 9 /HPF
UROBILINOGEN UR STRIP-MCNC: NORMAL MG/DL
WBC URINE: 7 /HPF

## 2023-10-01 PROCEDURE — 81025 URINE PREGNANCY TEST: CPT | Performed by: FAMILY MEDICINE

## 2023-10-01 PROCEDURE — 81001 URINALYSIS AUTO W/SCOPE: CPT | Performed by: FAMILY MEDICINE

## 2023-10-01 PROCEDURE — 80307 DRUG TEST PRSMV CHEM ANLYZR: CPT | Performed by: FAMILY MEDICINE

## 2023-10-01 RX ORDER — LEVOTHYROXINE SODIUM 150 UG/1
150 TABLET ORAL DAILY
Qty: 30 TABLET | Refills: 0 | Status: SHIPPED | OUTPATIENT
Start: 2023-10-01

## 2023-10-01 RX ORDER — NALTREXONE HYDROCHLORIDE 50 MG/1
50 TABLET, FILM COATED ORAL DAILY
Qty: 30 TABLET | Refills: 0 | Status: SHIPPED | OUTPATIENT
Start: 2023-10-01

## 2023-10-01 ASSESSMENT — ACTIVITIES OF DAILY LIVING (ADL)
ADLS_ACUITY_SCORE: 35

## 2023-10-01 NOTE — CONSULTS
Diagnostic Evaluation Consultation  Crisis Re-Assessment    Patient Name: Alexa Vee  Age:  48 year old  Legal Sex: female  Gender Identity: female  Pronouns:   Race: White  Ethnicity: Not  or   Language: English      Patient was assessed: Virtual: [employer] Crisis Assessment Start Time: 1952 Crisis Assessment Stop Time: 2015  Patient location: [facility] EMERGENCY DEPT                               Date of original assessment: 9/30/23; completed by Katie Marina LICSW.    Referral Data and Chief Complaint  Alexa Vee presents to the ED with family/friends. Patient is presenting to the ED for the following concerns: Substance use, Other (see comment) (auditory hallucinations, recent meth use).   Factors that make the mental health crisis life threatening or complex are:  Patient initally irritable; was at [facility] ED earlier today, was not fully cooperative, discharged.  Patient presents with command voices.  Pt not consistent with meds and used meth two days ago..    Assessment Methods  Assessment methods included conducting a formal interview with patient, review of medical records, collaboration with medical staff, and obtaining relevant collateral information from family and community providers when available.  : done     Patient response to interventions: eager to participate, acceptance expressed, verbalizes understanding  Coping skills were attempted to reduce the crisis:  painting, reading, listening to music, taking warm shower, watching TV, movies, deep breathing exercise, meditation and affirmations.     History of the Crisis   Writer engaged Pt in her DEC re-assessment today 10/1/23 virtually from 7:52pm and ending at 8:15pm. DEC re-assessment was requested by Dr. Kang to determine if Pt's psychotic concerns regarding hallucinations with voices has improved and change of disposition. Pt reported she was feeling better today as she was able to sleep and get good  rest in the ER last night. Pt endorsed increased depression, racing thoughts, worry and anxiety.  Pt reported having poor sleep but normal appetite.  Pt endorsed having some suicidal ideations today but denied having intent and plan.  Pt denied having HI, access to firearms and history of SIB.  Pt shared a history of previous suicide attempt about 6 months ago by overdosing pills.  Pt endorsed paranoia as she felt somone was watching her, following and ought to harm her.  Pt endorsed visual hallucination of seeing people who were not there.  Pt also currently denied having commanding auditory hallucination. Pt was able to engage in her DEC safety plan as she felt safe to return home. Pt was not imminent danger to herself or to others. Pt was able to demonstrate her ability to use support system and coping skills to mitigate her current mental health crisis. Pt was appropriate for outpatient mental health services. Dr. Kang reviewed and concurred with outpatient service disposition.    Changes since last assessment  Pt was calm, alert, oriented, engaged and cooperative.  Pt reported being able to sleep and get some rest in the ER made a positive difference.  Pt reported she was feeling better today.    Significant Clinical History  Current Anxiety Symptoms:  racing thoughts, excessive worry, anxious, panic attack  Current Depression/Trauma:  apathy, crying or feels like crying, helplessness, hoplessness, sadness, thoughts of death/suicide  Current Somatic Symptoms:  racing thoughts, anxious, excessive worry  Current Psychosis/Thought Disturbance:  high risk behavior, impulsive  Current Eating Symptoms:     Chemical Use History:  Alcohol: Daily  Last Use:: 09/28/23  Benzodiazepines: None  Opiates: None  Cocaine: None  Marijuana: None  Other Use: Methamphetamines (Pt reported she has been using meth daily.)  Last Use:: 09/29/23   Past diagnosis:  ADHD, Anxiety Disorder, Bipolar Disorder, Depression, PTSD,  "Personality Disorder  Family history:  No known history of mental health or chemical health concerns  Past treatment:  Individual therapy, Psychiatric Medication Management, Inpatient Hospitalization, Other  Details of most recent treatment:  Pt was  IP in February then went to IP CD tx  Other relevant history:          Collateral Information  Is there collateral information: Yes     Collateral information name, relationship, phone number:  daughter earlier today; Friend Kerrie at current encounter in ED with pt     What happened today: Pt has been using meth, not consistent with medications, hearing voices     What is different about patient's functioning: Patient has had this presentation but wants voices to subside, says she wont ever use meth again, is tired of voices     Concern about alcohol/drug use:      What do you think the patient needs:      Has patient made comments about wanting to kill themselves/others:  (pt has made vague references about \"being done\" due to voices pt says she is hearing)    If d/c is recommended, can they take part in safety/aftercare planning:  yes    Additional collateral information:  friend Kerrie wants to follow pt course of treatment and is available to help; vasyl told Edwardo earlier that she is supportive but unsure how much tangible help she can currently provide pt.     Risk Assessment  [place] Suicide Severity Rating Scale Recent (completed since last contact):   Suicidal Ideation (Recent)  Q1 Wished to be Dead (Past Month): yes  Q2 Suicidal Thoughts (Past Month): yes  Q3 Suicidal Thought Method: no  Q4 Suicidal Intent without Specific Plan: no  Q5 Suicide Intent with Specific Plan: no  Within the Past 3 Months?: no  Level of Risk per Screen: low risk  Intensity of Ideation (Recent)  Most Severe Ideation Rating (Past 1 Month): 1  Description of Most Severe Ideation (Past 1 Month): \"I am a burden to others.\"  Frequency (Past 1 Month): Once a week  Duration " (Past 1 Month): Fleeting, few seconds or minutes  Suicidal Behavior (Recent)  Actual Attempt (Past 3 Months): No  Total Number of Actual Attempts (Past 3 Months): 0  Actual Attempt Description (Past 3 Months): None reported.  Has subject engaged in non-suicidal self-injurious behavior? (Past 3 Months): No  Interrupted Attempts (Past 3 Months): No  Total Number of Interrupted Attempts (Past 3 Months): 0  Interrupted Attempt Description (Past 3 Months): none reported.  Aborted or Self-Interrupted Attempt (Past 3 Months): No  Total Number of Aborted or Self-Interrupted Attempts (Past 3 Months): 0  Aborted or Self-Interrupted Attempt Description (Past 3 Months): none reported.  Preparatory Acts or Behavior (Past 3 Months): No  Total Number of Preparatory Acts (Past 3 Months): 0  Preparatory Acts or Behavior Description (Past 3 Months): None reported.    Environmental or Psychosocial Events: challenging interpersonal relationships, impulsivity/recklessness, ongoing abuse of substances  Protective Factors: Protective Factors: help seeking, lives in a responsibly safe and stable environment, responsibilities and duties to others, including pets and children, strong bond to family unit, community support, or employment, supportive ongoing medical and mental health care relationships    Does the patient have thoughts of harming others? Feels Like Hurting Others: no  Previous Attempt to Hurt Others: no  Current presentation:  (Pt was calm, alert, oriented, engaged and cooperative.  Pt presented with coherent, normal rate of speech, constricted, depressed and anxious affect.)  Is the patient engaging in sexually inappropriate behavior?: no    Is the patient engaging in sexually inappropriate behavior?  no        Mental Status Exam   Affect: Constricted  Appearance: Appropriate  Attention Span/Concentration: Attentive  Eye Contact: Engaged, Variable    Fund of Knowledge: Appropriate   Language /Speech Content: Fluent  Language  /Speech Volume: Normal  Language /Speech Rate/Productions: Normal  Recent Memory: Variable  Remote Memory: Variable  Mood: Anxious, Apathetic, Depressed, Sad  Orientation to Person: Yes   Orientation to Place: Yes  Orientation to Time of Day: Yes  Orientation to Date: Yes     Situation (Do they understand why they are here?): Yes  Psychomotor Behavior: Normal  Thought Content: Clear, Suicidal, Paranoia, Hallucinations  Thought Form: Paranoia, Intact      Medication  Psychotropic medications:   Medication Orders - Psychiatric (From admission, onward)      Start     Dose/Rate Route Frequency Ordered Stop    09/30/23 1920  hydrOXYzine (ATARAX) tablet 25 mg         25 mg Oral AT BEDTIME PRN 09/30/23 1920 09/30/23 1920  LORazepam (ATIVAN) tablet 1 mg         1 mg Oral EVERY 8 HOURS PRN 09/30/23 1920               Current Care Team  Patient Care Team:  No Ref-Primary, Physician as PCP - General    Diagnosis  Patient Active Problem List   Diagnosis Code    CARDIOVASCULAR SCREENING; LDL GOAL LESS THAN 130 Z13.6    Bipolar 2 disorder (H) F31.81    Episodic mood disorder (H24) F39    Methamphetamine use disorder, severe, dependence (H) F15.20    Personality disorder (H) F60.9    Generalized anxiety disorder F41.1    Panic disorder F41.0    PTSD (post-traumatic stress disorder) F43.10    Severe mixed bipolar I disorder with psychotic features (H) F31.64       Primary Problem This Admission  Active Hospital Problems    *Episodic mood disorder (H24)      Methamphetamine use disorder, severe, dependence (H)        Clinical Summary and Substantiation of Recommendations   Pt presenting in the ER yesterday due to worsening of auditory hallucination, suicidal ideations, and substance abuse.  Pt stopped taking her psychiatric medications about a month ago and has been using meth daily.  Pt was in her observation status as her commanding auditory hallucination was likely induced by her substance abuse with poor sleep and  medication non-adherence.  Pt engaged in her DEC re-assessment today to determine appropriate level of care.  Pt reported feeling much better today after getting good night sleep and rest in the ER.  Pt endorsed increased depression, racing thoughts, worry and anxiety. Pt reported having poor sleep but normal appetite. Pt endorsed having some suicidal ideations today but denied having intent and plan. Pt denied having HI, access to firearms and history of SIB. Pt shared a history of previous suicide attempt about 6 months ago by overdosing pills. Pt endorsed paranoia as she felt somone was watching her, following and ought to harm her. Pt endorsed visual hallucination of seeing people who were not there. Pt also currently denied having commanding auditory hallucination. Pt was able to engage in her DEC safety plan as she felt safe to return home. Pt was not imminent danger to herself or to others. Pt was able to demonstrate her ability to use support system and coping skills to mitigate her current mental health crisis. Pt was appropriate for outpatient mental health services. Dr. Kang reviewed and concurred with outpatient service disposition.                          Patient coping skills attempted to reduce the crisis:  painting, reading, listening to music, taking warm shower, watching TV, movies, deep breathing exercise, meditation and affirmations.    Disposition  Recommended disposition: Individual Therapy, Medication Management, Substance Abuse Disorder Treatment, Rule 25/LULÚ Assessment        Reviewed case and recommendations with attending provider. Attending Name: Benjamin Kang DO       Attending concurs with disposition: yes       Patient and/or validated legal guardian concurs with disposition:   yes       Final disposition:  discharge    Legal status on admission:      Assessment Details   Total duration spent on the patient case in minutes: 30 min     CPT code(s) utilized: 30955 -  Psychotherapy for Crisis - 60 (30-74*) min    Eddie Mcdowell, [facility], Psychotherapist  DEC - Triage & Transition Services  Callback: [phone]        85

## 2023-10-01 NOTE — ED NOTES
Pt slept the entire night. Presented toothbrush along with nighttime hygiene cares. Pt denied wanting anything. Denied wanting any interventions last night.   Slept well after given Zyprexa.  Dec will reassess this AM.

## 2023-10-01 NOTE — ED PROVIDER NOTES
Emergency Department Patient Sign-out       Brief HPI:  This is a 48 year old female signed out to me by Dr. Lionel Ferraro.  See initial ED Provider note for details of the presentation.     Patient slept well last evening.  She is now up.  Contacted DEC for reassessment  Reassessment this morning to determine if her psychotic concerns regarding hallucinations with voices has improved.  There is some concern that this might be related to methamphetamine use 2 days ago and that she might not need hospital placement.      The patient does have a past medical history for episodic mood disorder, methamphetamine abuse severe dependency, bipolar 2 disorder, intermittent psychotic features, generalized anxiety disorder with panic and PTSD.      Exam:   Patient Vitals for the past 24 hrs:   BP Temp Temp src Pulse Resp SpO2 Weight   09/30/23 1903 101/75 97.3  F (36.3  C) Temporal 98 18 98 % 80.5 kg (177 lb 8 oz)           ED RESULTS:   Results for orders placed or performed during the hospital encounter of 09/30/23 (from the past 24 hour(s))   CBC with platelets differential     Status: None    Collection Time: 09/30/23  7:51 PM    Narrative    The following orders were created for panel order CBC with platelets differential.  Procedure                               Abnormality         Status                     ---------                               -----------         ------                     CBC with platelets and d...[[id number]]                      Final result                 Please view results for these tests on the individual orders.   Comprehensive metabolic panel     Status: Abnormal    Collection Time: 09/30/23  7:51 PM   Result Value Ref Range    Sodium 138 135 - 145 mmol/L    Potassium 3.6 3.4 - 5.3 mmol/L    Carbon Dioxide (CO2) 24 22 - 29 mmol/L    Anion Gap 11 7 - 15 mmol/L    Urea Nitrogen 15.7 6.0 - 20.0 mg/dL    Creatinine 0.79 0.51 - 0.95 mg/dL    GFR Estimate >90 >60 mL/min/1.73m2    Calcium 8.9  8.6 - 10.0 mg/dL    Chloride 103 98 - 107 mmol/L    Glucose 98 70 - 99 mg/dL    Alkaline Phosphatase 53 35 - 104 U/L    AST 15 0 - 45 U/L    ALT 14 0 - 50 U/L    Protein Total 5.7 (L) 6.4 - 8.3 g/dL    Albumin 3.5 3.5 - 5.2 g/dL    Bilirubin Total 0.2 <=1.2 mg/dL   TSH with free T4 reflex     Status: Abnormal    Collection Time: 09/30/23  7:51 PM   Result Value Ref Range    TSH 5.63 (H) 0.30 - 4.20 uIU/mL   Acetaminophen level     Status: Abnormal    Collection Time: 09/30/23  7:51 PM   Result Value Ref Range    Acetaminophen <5.0 (L) 10.0 - 30.0 ug/mL   Salicylate level     Status: Normal    Collection Time: 09/30/23  7:51 PM   Result Value Ref Range    Salicylate <0.3   mg/dL   CBC with platelets and differential     Status: None    Collection Time: 09/30/23  7:51 PM   Result Value Ref Range    WBC Count 5.9 4.0 - 11.0 10e3/uL    RBC Count 4.46 3.80 - 5.20 10e6/uL    Hemoglobin 13.5 11.7 - 15.7 g/dL    Hematocrit 40.2 35.0 - 47.0 %    MCV 90 78 - 100 fL    MCH 30.3 26.5 - 33.0 pg    MCHC 33.6 31.5 - 36.5 g/dL    RDW 13.5 10.0 - 15.0 %    Platelet Count 180 150 - 450 10e3/uL    % Neutrophils 57 %    % Lymphocytes 34 %    % Monocytes 6 %    % Eosinophils 3 %    % Basophils 0 %    % Immature Granulocytes 0 %    NRBCs per 100 WBC 0 <1 /100    Absolute Neutrophils 3.3 1.6 - 8.3 10e3/uL    Absolute Lymphocytes 2.0 0.8 - 5.3 10e3/uL    Absolute Monocytes 0.4 0.0 - 1.3 10e3/uL    Absolute Eosinophils 0.2 0.0 - 0.7 10e3/uL    Absolute Basophils 0.0 0.0 - 0.2 10e3/uL    Absolute Immature Granulocytes 0.0 <=0.4 10e3/uL    Absolute NRBCs 0.0 10e3/uL   T4 free     Status: Normal    Collection Time: 09/30/23  7:51 PM   Result Value Ref Range    Free T4 0.92 0.90 - 1.70 ng/dL   [facility] Assessment Consult Order:     Status: None ()    Collection Time: 09/30/23  8:03 PM    Katie Kruse [facility]     9/30/2023 10:52 PM  Diagnostic Evaluation Consultation  Crisis Assessment    Patient Name:  Alexa Vee  Age:  48 year old  Legal Sex: female  Gender Identity: female  Pronouns:   Race: White  Ethnicity: Not  or   Language: English      Patient was assessed: Virtual: [facility] Crisis Assessment Start   Time: 2120 Crisis Assessment Stop Time: 2150  Patient location: [facility] EMERGENCY DEPT                             ED04    Referral Data and Chief Complaint  Alexa Vee presents to the ED with family/friends. Patient is   presenting to the ED for the following concerns: Substance use,   Other (see comment) (auditory hallucinations, recent meth use).     Factors that make the mental health crisis life threatening or   complex are:  Patient initally irritable; was at [facility] ED   earlier today, was not fully cooperative, discharged.  Patient   presents with command voices.  Pt not consistent with meds and   used meth two days ago..      Informed Consent and Assessment Methods  Explained the crisis assessment process, including applicable   information disclosures and limits to confidentiality, assessed   understanding of the process, and obtained consent to proceed   with the assessment.  Assessment methods included conducting a   formal interview with patient, review of medical records,   collaboration with medical staff, and obtaining relevant   collateral information from family and community providers when   available.  : done     Patient response to interventions: acceptance expressed  Coping skills were attempted to reduce the crisis:  Pt using meth   recently; showing up in ED but initially less than cooperative.    patient did become cooperative sufficient to express needs and   help with plan to address her current presenting symptoms.     History of the Crisis   Pt has PMH dx to include Bipolar II; unspecified personality dx;   mood dx; AKILAH, PTSD, LULÚ. Pt says she has ADHD but writer does not   see this on chart. As noted above, patient says she has been   hearing  "command voices for about a week or \"3 to 5 days or so.\"    Pt denies she has trauma.   Pt used meth two days ago.   She told   ED she has not taken her medication for around a week but she is   somewhat inconsistent with estimated time frame.  Patient is   actively using meth but says she has an active Rx of Vyvance that   she gets from her prescriber Mana at [employer].  Pt says she   also has a therapist from family life whom she also only knows by   first name, Mariana.  Pt says she has not seen Mariana for a   month but writer gets impression that patient is merely   estimating and is not really sure.   Patient likes prescriber but   does not believe therapist is good fit.  Patient has been IP MH   about 5 times now.   Pt has hx of non or very loose compliance   with treatment plan.   Today, patient was descrived as irritable   at [facility] and tried to leave before interview was over.   Pt   initially presented same at current encounter but patient did   come to relax a bit and talk with writer in complete sentences   with what patient says she thinks she needs.   Pt initally asked   if writer thinks patient needs to be \"locked up.\"   Answering in   the negative and asking patient if she thinks she needs to be   \"locked up,\" patient then seemed to relax and was willing to   engage and talk about plan to help her command voices stop or   lessen.  Pt says she was once 4.5 years clean.  She once attended    and says she does not want formal CD tx but is willing to   return to  and reconnect with sponsor.  Pt has job and safe   place to live currently    Brief Psychosocial History  Family:   , Children    Support System:  Sibling(s), Children, Partner  Employment Status:  employed full-time  Source of Income:  salary/wages  Financial Environmental Concerns:  No concerns identified  Current Hobbies:     Barriers in Personal Life:  behavioral concerns    Significant Clinical History  Current Anxiety Symptoms:  " "racing thoughts, excessive worry  Current Depression/Trauma:  impaired decision making  Current Somatic Symptoms:  racing thoughts, excessive worry,   sweating, flushing, shaking  Current Psychosis/Thought Disturbance:  distractability, auditory   hallucinations, displaces blame  Current Eating Symptoms:     Chemical Use History:  Alcohol: Daily  Last Use:: 09/28/23  Benzodiazepines: None  Opiates: None  Cocaine: None  Marijuana: None  Other Use: Methamphetamines  Last Use:: 09/27/23   Past diagnosis:  ADHD, Anxiety Disorder, Bipolar Disorder,   Depression, PTSD, Personality Disorder  Family history:  No known history of mental health or chemical   health concerns  Past treatment:  Individual therapy, Psychiatric Medication   Management, Inpatient Hospitalization, Other  Details of most recent treatment:  Pt was MH IP in February then   went to IP CD tx  Other relevant history:       Collateral Information  Is there collateral information: Yes     Collateral information name, relationship, phone number:    daughter earlier today; Friend Kerrie at current encounter in ED   with pt     What happened today: Pt has been using meth, not consistent with   medications, hearing voices     What is different about patient's functioning: Patient has had   this presentation but wants voices to subside, says she wont ever   use meth again, is tired of voices     Concern about alcohol/drug use:  yes, meth    What do you think the patient needs:  stop meth; med compliance    Has patient made comments about wanting to kill   themselves/others:  (pt has made vague references about \"being   done\" due to voices pt says she is hearing)    If d/c is recommended, can they take part in safety/aftercare   planning:  yes    Additional collateral information:  friend Kerrie wants to follow   pt course of treatment and is available to help; vasyl told   Edwardo earlier that she is supportive but unsure how much   tangible help she " can currently provide pt.     Risk Assessment  [place] Suicide Severity Rating Scale Full Clinical Version:  Suicidal Ideation  Q2 Non-Specific Active Suicidal Thoughts (Lifetime): Yes  Q6 Suicide Behavior (Lifetime): yes     Suicidal Behavior (Lifetime)  Actual Attempt (Lifetime): Yes  Has subject engaged in non-suicidal self-injurious behavior?   (Lifetime): No  Interrupted Attempts (Lifetime): No  Aborted or Self-Interrupted Attempt (Lifetime): No  Preparatory Acts or Behavior (Lifetime): No    [place] Suicide Severity Rating Scale Recent:   Suicidal Ideation (Recent)  Q1 Wished to be Dead (Past Month): yes  Q2 Suicidal Thoughts (Past Month): yes  Q3 Suicidal Thought Method: no  Q4 Suicidal Intent without Specific Plan: no  Q5 Suicide Intent with Specific Plan: no  Within the Past 3 Months?: no  Level of Risk per Screen: high risk          Environmental or Psychosocial Events: impulsivity/recklessness,   ongoing abuse of substances  Protective Factors: Protective Factors: reality testing ability,   lives in a responsibly safe and stable environment, optimistic   outlook - identification of future goals    Does the patient have thoughts of harming others? Feels Like   Hurting Others: no  Previous Attempt to Hurt Others: no  Current presentation:  (intermittently irritable)  Is the patient engaging in sexually inappropriate behavior?: no    Is the patient engaging in sexually inappropriate behavior?  no          Mental Status Exam   Affect: Constricted  Appearance: Appropriate  Attention Span/Concentration: Attentive  Eye Contact: Variable    Fund of Knowledge: Appropriate   Language /Speech Content: Non-Fluent, Fluent  Language /Speech Volume: Normal  Language /Speech Rate/Productions: Normal, Minimally Responsive,   Other (please comment) (variable; more fluent with time)  Recent Memory: Variable  Remote Memory: Variable  Mood: Irritable, Normal  Orientation to Person: Yes   Orientation to Place:  "Yes  Orientation to Time of Day: Yes  Orientation to Date: Yes     Situation (Do they understand why they are here?): Yes  Psychomotor Behavior: Normal  Thought Content: Clear  Thought Form: Intact     Mini-Cog Assessment  Number of Words Recalled:    Clock-Drawing Test:     Three Item Recall:    Mini-Cog Total Score:       Medication  Psychotropic medications:   Medication Orders - Psychiatric (From admission, onward)      Start     Dose/Rate Route Frequency Ordered Stop    09/30/23 1920  hydrOXYzine (ATARAX) tablet 25 mg         25 mg Oral AT BEDTIME PRN 09/30/23 1920 09/30/23 1920  LORazepam (ATIVAN) tablet 1 mg         1 mg Oral EVERY 8 HOURS PRN 09/30/23 1920               Current Care Team  Patient Care Team:  No Ref-Primary, Physician as PCP - General    Diagnosis  Patient Active Problem List   Diagnosis Code    CARDIOVASCULAR SCREENING; LDL GOAL LESS THAN 130 Z13.6    Bipolar 2 disorder (H) F31.81    Episodic mood disorder (H) F39    Methamphetamine use disorder, severe, dependence (H) F15.20    Personality disorder (H) F60.9    Generalized anxiety disorder F41.1    Panic disorder F41.0    PTSD (post-traumatic stress disorder) F43.10    Severe mixed bipolar I disorder with psychotic features (H)   F31.64       Primary Problem This Admission  Active Hospital Problems    *Episodic mood disorder (H)      Methamphetamine use disorder, severe, dependence (H)        Clinical Summary and Substantiation of Recommendations   patient recently used meth, has been medication non-compliant and   has not been sleeping.  Pt wants voices to stop.  Pt does not   want to be \"locked up.\"  She does not want to go to CD tx again.    Pt can be stabilized in ED with medications, sleep and be   reassessed.  Time and care in ED may allow patient's symptoms to   subside making it safe for patient to discharge later in ED   encounter.   Patient is pretty irritable about voices and about   being in ED.  Pt has been IP MH several " times.  She is currently   not actively suicidal or homicidal.  She tried to leave [facility]   ED earlier and is not a particularly good candidate for IP MH at   this time.  Medication, observation and ressessment is   recommended for patient's symptoms and presentation for needs    Patient coping skills attempted to reduce the crisis:  Pt using   meth recently; showing up in ED but initially less than   cooperative.  patient did become cooperative sufficient to   express needs and help with plan to address her current   presenting symptoms.    Disposition  Recommended disposition: Observation        Reviewed case and recommendations with attending provider.   Attending Name: Lionel Ferraro MD       Attending concurs with disposition: yes       Patient and/or validated legal guardian concurs with disposition:     yes       Final disposition:  observation    Legal status on admission:  voluntary     Assessment Details   Total duration spent on the patient case in minutes: 30 min     CPT code(s) utilized: 62077 - Psychotherapy for Crisis - 60   (30-74*) min    [facility], Psychotherapist  DEC - Triage & Transition Services  Callback: [phone]         HCG qualitative urine (UPT)     Status: Normal    Collection Time: 10/01/23  7:19 AM   Result Value Ref Range    hCG Urine Qualitative Negative Negative   Urine Drugs of Abuse Screen     Status: None (In process)    Collection Time: 10/01/23  7:19 AM    Narrative    The following orders were created for panel order Urine Drugs of Abuse Screen.  Procedure                               Abnormality         Status                     ---------                               -----------         ------                     Drug Abuse Screen Qual U...[[id number]]                      In process                   Please view results for these tests on the individual orders.       ED MEDICATIONS:   Medications   ibuprofen (ADVIL/MOTRIN) tablet 600 mg (has no  administration in time range)   LORazepam (ATIVAN) tablet 1 mg (has no administration in time range)   melatonin tablet 3 mg (has no administration in time range)   hydrOXYzine (ATARAX) tablet 25 mg (has no administration in time range)   OLANZapine (zyPREXA) tablet 10 mg (10 mg Oral $Given 9/30/23 6894)         Impression:  No diagnosis found.    Plan:    Repeat mental health assessment this morning determine if patient can be released for psychotic features were temporary from use of methamphetamine or if she needs hospital placement for ongoing mental health assessment and care.      Benjamin Kang DO Johnson, Daniel Eugene, DO  10/01/23 0736

## 2023-10-01 NOTE — ED TRIAGE NOTES
Patient presents with hearing voices after being off of medications for a week. The voices are telling her to hurt herself but she has not attempted recently.     Triage Assessment       Row Name 09/30/23 8092       Triage Assessment (Adult)    Airway WDL WDL       Respiratory WDL    Respiratory WDL WDL       Skin Circulation/Temperature WDL    Skin Circulation/Temperature WDL WDL       Cardiac WDL    Cardiac WDL WDL       Peripheral/Neurovascular WDL    Peripheral Neurovascular WDL WDL       Cognitive/Neuro/Behavioral WDL    Cognitive/Neuro/Behavioral WDL WDL

## 2023-10-01 NOTE — ED PROVIDER NOTES
This is a 48-year-old female who was held in ED last evening after presenting with auditory hallucinations.  They were not commanding her to hurt herself or others.  She has had suicide thoughts intermittent but has not had serious continual thoughts or thoughts of self-harm or harming others.  She was having psychotic features that were felt to be related to recent methamphetamine use.  She has relapsed on use of methamphetamine.  She was held overnight where she remained cooperative and slept.  She woke this morning and had a repeat DEC assessment.  They were able to establish a care plan that allowed her to be discharged home.  I did refill her naltrexone and her Synthroid.    On interview the patient she was awake and alert.  Denied any auditory hallucinations at this time.  States she is never had any command hallucinations to hurt herself or others.  States she has had very fleeting thoughts of suicide that is been a chronic issue but no strong persistent thoughts and no plan to hurt herself.  She indicated she would follow through with the [facility] care plan for ongoing outpatient mental health.  This is outlined in detail by Eddie who reassessed her through DEC assessment this morning.     Benjamin Kang,   10/01/23 0839

## 2023-10-01 NOTE — DISCHARGE INSTRUCTIONS
Aftercare Plan  If I am feeling unsafe or I am in a crisis, I will: reach out to my therapist, daughter, close friend, Kerrie, ex-boyfriend, Eddie, my sponsor and [facility] crisis line for their support.  Contact my established care providers   Call the National Suicide Prevention Lifeline: 988  Go to the nearest emergency room   Call 911     Writer encourage Pt to take her medications consistently as prescribed and keep all of scheduled appointments with her outpatient service providers.  Writer recommended Pt to engage in CD Assessment/treatment to help her with sobriety, but she declined.  Pt agreed to attend her AA/NA support group and connect with her sponsor to promote sobriety.  Pt also agreed to follow up with her current medication manager at [facility] and her current individual therapist at [facility] as well.  DEC coordinator will contact Pt within next 1 or 2 business days to ensure coordination of care and provide assistance with appointments.    Warning signs that I or other people might notice when a crisis is developing for me: increased depression, isolation, cry, worry, panic attacks, racing thoughts, anxiety, paranoia, auditory and visual hallucinations, substance abuse, suicidal ideations, not taking psychiatric medications, disrupted sleep and appetite.    Things I am able to do on my own to cope or help me feel better: watching TV, movies and listening to music, taking walks with my dog, taking warm shower or bath to relax, deep breathing exercise, meditation and affirmations, reading and journaling.     Things that I am able to do with others to cope or help me better: reaching out to supportive people, attending AA/NA support group and connecting with my sponsor.     Things I can use or do for distraction: watching TV, movies and listening to music, taking walks with my dog, taking warm shower or bath to relax, deep breathing exercise, meditation and affirmations, reading and journaling.       Changes I can make to support my mental health and wellness: being with sober people and sober environment to promote sobriety.  Taking psychiatric medications consistently and stop using substance.  Joining a peer support group through OLIVIA RODRIGUEZ to increase positive support system.  [facility] (National [place] on Mental Illness) improves the lives of children and adults with mental illnesses and their families by providing free classes on mental illnesses and support groups for adults with mental illnesses, parents and family members. For more information:  Phone: [phone]  Toll free: 2-861-RCQQ-HELPS  Website: www.namArray StormelWit studio.Opowerhttp://www.namihps.org/      People in my life that I can ask for help: my therapist, daughter, friend, Kerrie, ex-boyfriend, Eddie and sponsor.     Your [facility] has a mental health crisis team you can call 24/7: [facility]  [phone]    Other things that are important when I'm in crisis: support from my family and friends.   National Suicide Prevention Lifeline at 928  Crisis Text Line: Free help is available 24/7 by texting HOME to 768491 or texting AYQuVIS for help in Romanian.  The [employer] Project at 108-219-5973  [facility] Helpline at 505-732-2461    Additional resources and information: Below is a list of FREE Mental Health Options in the [facility] Area:    [facility] ([facility])  Serves those in emotional crisis with 24-hour, seven-day-a-week crisis counseling, assessment, referral, and medication management.   Suicidal: [phone] Consultation: [phone]  [address] [place] [address] 24/7 Crisis Intervention Center     [facility]-in Counseling Center  [phone]  Serves those in need of free outpatient mental health care  Hours: Mon, Wed, Fri 1-3pm; Mon-Thurs 6:30-8:30pm    [facility]  [address] [place] [address]  [phone]     Substance Use Disorder Direct Access Resources    It is recommended  that you abstain from all mood altering chemicals. Please contact the sober support hotline (338-374-9790) as needed; phones are answered 24 hours a day, 7 days a week.    To access substance use treatment you must have a comprehensive assessment completed to begin any treatment program.     If uninsured, please contact your county of residence for eligibility screen to substance use disorder evaluation and treatment:    [place] - [phone]   [place] - [phone]   [place] - [phone]   [place] - [phone]   [place]  [phone]   [place] - [phone]   [facility] [phone]   [place] - [phone]     If you have private insurance, call the customer service number on the back of your insurance card to find an in-network substance abuse use disorder assessment. The ideal provider will be a treatment facility, licensed in the [facility].     Community LULÚ Evaluations: Clients may call their county for a full list of providers - Availability and services listed belo are subject to change, please call the provider to confirm    [facility]  [phone]  [address] [place] [address], [address]  *Please call the above number to schedule a comprehensive assessment for determination of level of care needs. In person and virtual appointments available Mon-Fri.    [facility], [address] [place] [address], First Floor, [address], [place] [address] (next to the outpatient lab)    Phone: [phone]   Provides bridging services to people with Opiate Use Disorders (OUD) seeking care. This is a front door to Medication Assisted Treatments (MAT), ages 16+  Walk In hours: Monday-Friday 9:00am-3:00pm    [facility]  [phone]  Walk in Assessments: Mon-Friday 7a-1:45p  [address], [place], [address]    [facility]  Central Access [phone]  [address] [place] [address], [address]  *by appointment  only    [facility]  [phone] (phone consultation available )  Locations in: [place], [place], [place], [place], and [place], [place]  Zambian virtual IOP [phone] or visit [email or web address]   Also offers LGBTQ programming     [facility]  [phone]  [address]  [place], [place], [address]  *Currently only offered via telehealth - call to set up an appointment    [facility]  [address] [place] [address], [address]  [facility]  For more information to to make a referral call:  [phone]  Walk-in on   9-11 a.m.    [facility]  [phone]  [address] [place], [place], [address]  *available by appointments only    [facility]  [phone]  [address] [place], [place], [address]  *available by appointment only    [facility]  [phone]  [address] [place], [place], [address]  *walk in assessments available M-F starting at 7 am.    [facility]  [phone]  Locations: [facility], [facility], and [place]  *Walk in assessments availble M-F starting at 8 am -virtual only    [facility]  [phone]  [address] [place] [address]    [facility]  Virtual + Locations: [place], [place], [place], [place], [facility], [place], [place]   [phone]  *available by appointment only    [facility]  [phone]  [address]  [place], [place], [address]    [facility]  [phone]  [address] [place], [place], [address]  *available by appointment    [facility]  [phone]  [address] [place], [place], [address]  *walk ins available - from 9-3    [facility]  MAT program: [phone]  [address]  [place], [place], [address]    [place]  [phone]  Same day substance use disorder assessments are  available Monday - Friday, via walk-in or by appointment at the [place] location.  [address], [address], [place] [address]     [facility] - adolescent and adult SUDs services  [phone]  Offer services Monday through Friday, as well as evening hours Monday through Thursday. Normally, a first appointment will be scheduled within one week  [email or web address]  Locations all over [place]    If you are intoxicated, you may be required to detox at a detox facility before starting treatment. The following are detox facilities that you can self present to. All detox facilities are able to help you complete an assessment prior to discharge if you choose:    [facility]: Arrive at a [facility] for immediate medical evaluation    [facility]: [address] [place], [place], [address].         [phone]    [facility]: [address] [place], [place], [address]  [phone]     [facility]): [address] [place], [place], [address]  [phone]     [facility]: [address] [place], [place], [address], [phone]         Ways to help cope with sobriety:    -- Take prescribed medicines as scheduled  -- Keep follow-up appointments  -- Talk to others about your concerns  -- Get regular exercise  -- Practice deep breathing skills  -- Eat a healthy diet  -- Use community resources, including hotline numbers, [facility] crisis and support meetings  -- Stay sober and avoid places/people/things associated with substance use  --Maintain a daily schedule/routine  --Get at least 7-8 hours of sleep per night  --Create a list 10--20 healthy activities that you can do that are enjoyable and do not involve substance use  --Create daily goals (approx. 1-4 goals) per day and work to achieve them throughout the day.       Free Resources:    [place] Recovery Connection ([facility])  [facility] connects people  seeking recovery to resources that help foster and sustain long-term recovery. Whether you are seeking resources for treatment, transportation, housing, job training, education, health care or other pathways to recovery, [facility] is a great place to start.  Phone: 169.680.2377. www.minnesotaZipit Wireless.Bluff Wars (Great listing of all types of recovery and non-recovery related resources)    Alcoholics Anonymous  Phone: 3-437-ALCOHOL  Website: HTTP://WWW.AA.ORG/  AA [place] (004-727-7293 or http://aaGraffle.org)  AA [employer] (580-743-3214 or www.aastpaul.org)     Narcotics Anonymous  Phone: 196.310.6496  Website: www.[employer].WhoWanna.    [facility] [address], [place], [place],  Phone: [phone]  Drop-in Hours: Monday-Friday 9-11:30 am. By appointment at other times.  Provides: [facility] is a drop-in center on the [facility] side [facility] that provides a safe space for individuals who are homeless and have a history of chemical use. Sobriety is not a requirement but drugs and alcohol are not allowed on the property.  Services: Non-clients can access drop-in services such as Recovery and Harm Reduction Groups, referrals to case management, community activities, shower facilities, and a pool table. Individuals who are homeless and have chemical health needs may be eligible for enrollment into [facility]'s case management program. Clients and  work together to access benefits, treatment, health care, shelter, and external housing resources.        Crisis Lines  Crisis Text Line  Text 842661  You will be connected with a trained live crisis counselor to provide support.    Por espanol, texto  FERMIN a 013315 o texto a 442-AYUDAME en WhatsApp    The [employer] Project (LGBTQ Youth Crisis Line)  7.217.571.8623  text START to 187-531      Community Resources  Fast Tracker  Linking people to mental health and substance use disorder resources  Zibbyn.Bluff Wars     [place]  "Mental Health Warm Line  Peer to peer support  Monday thru Saturday, 12 pm to 10 pm  952.862.8458 or 9.626.326.5349  Text \"Support\" to 9[address]    National [place] on Mental Illness (JENNIFER)  163.526.4206 or 1.888.JENNIFER.HELPS      Mental Health Apps  My3  https://Likelii.org/    VirtualHopeBox  https://First Insight/apps/virtual-hope-box/      Additional Information  Today you were seen by a licensed mental health professional through Triage and Transition services, [facility] Providers ([facility])  for a crisis assessment in the Emergency Department at [facility].  It is recommended that you follow up with your established providers (psychiatrist, mental health therapist, and/or primary care doctor - as relevant) as soon as possible. Coordinators from [facility] will be calling you in the next 24-48 hours to ensure that you have the resources you need.  You can also contact [facility] coordinators directly at [phone]. You may have been scheduled for or offered an appointment with a mental health provider. [facility] maintains an extensive network of licensed behavioral health providers to connect patients with the services they need.  We do not charge providers a fee to participate in our referral network.  We match patients with providers based on a patient's specific needs, insurance coverage, and location.  Our first effort will be to refer you to a provider within your care system, and will utilize providers outside your care system as needed.         "

## 2023-10-01 NOTE — CARE PLAN
"Alexa Vee  September 30, 2023  Plan of Care Hand-off Note     Patient Care Path: observation, medication, sleep with subsequent reassessment later in encounter    Plan for Care:   patient recently used meth, has been medication non-compliant and has not been sleeping.  Pt wants voices to stop.  Pt does not want to be \"locked up.\"  She does not want to go to CD tx again.  Pt can be stabilized in ED with medications, sleep and be reassessed.  Time and care in ED may allow patient's symptoms to subside making it safe for patient to discharge later in ED encounter.   Patient was pretty irritable about voices and about being in ED but calmed with assessment time.  Pt has been IP MH several times.  She is currently not actively suicidal or homicidal.  She tried to leave [facility] ED earlier and is not a particularly good candidate for IP MH at this time.  Medication, observation and ressessment is recommended for patient's symptoms and presentation for needs.    Identified Goals and Safety Issues: (P) stabilize, treat voices, sleep    Overview:  (P) Friend Kerrie [phone] (P) daughter    Legal Status:  voluntary      Psychiatry Consult:  No.  Pt has active psychiatrist      Updated   regarding plan of care.  Attending MD; ED staff.       RD Brooks       "

## 2023-10-01 NOTE — CONSULTS
Diagnostic Evaluation Consultation  Crisis Assessment    Patient Name: Alexa Vee  Age:  48 year old  Legal Sex: female  Gender Identity: female  Pronouns:   Race: White  Ethnicity: Not  or   Language: English      Patient was assessed: Virtual: [employer] Crisis Assessment Start Time: 2120 Crisis Assessment Stop Time: 2150  Patient location: [facility] EMERGENCY DEPT                             ED04    Referral Data and Chief Complaint  Alexa Vee presents to the ED with family/friends. Patient is presenting to the ED for the following concerns: Substance use, Other (see comment) (auditory hallucinations, recent meth use).   Factors that make the mental health crisis life threatening or complex are:  Patient initally irritable; was at [facility] ED earlier today, was not fully cooperative, discharged.  Patient presents with command voices.  Pt not consistent with meds and used meth two days ago..      Informed Consent and Assessment Methods  Explained the crisis assessment process, including applicable information disclosures and limits to confidentiality, assessed understanding of the process, and obtained consent to proceed with the assessment.  Assessment methods included conducting a formal interview with patient, review of medical records, collaboration with medical staff, and obtaining relevant collateral information from family and community providers when available.  : done     Patient response to interventions: acceptance expressed  Coping skills were attempted to reduce the crisis:  Pt using meth recently; showing up in ED but initially less than cooperative.  patient did become cooperative sufficient to express needs and help with plan to address her current presenting symptoms.     History of the Crisis   Pt has PMH dx to include Bipolar II; unspecified personality dx; mood dx; AKILAH, PTSD, LULÚ. Pt says she has ADHD but writer does not see this on chart. As noted above,  "patient says she has been hearing command voices for about a week or \"3 to 5 days or so.\"  Pt denies she has trauma.   Pt used meth two days ago.   She told ED she has not taken her medication for around a week but she is somewhat inconsistent with estimated time frame.  Patient is actively using meth but says she has an active Rx of Vyvance that she gets from her prescriber Mana at [facility] [employer].  Pt says she also has a therapist from family life whom she also only knows by first name, Mariana.  Pt says she has not seen Mariana for a month but writer gets impression that patient is merely estimating and is not really sure.   Patient likes prescriber but does not believe therapist is good fit.  Patient has been IP MH about 5 times now.   Pt has hx of non or very loose compliance with treatment plan.   Today, patient was descrived as irritable at [facility] and tried to leave before interview was over.   Pt initially presented same at current encounter but patient did come to relax a bit and talk with writer in complete sentences with what patient says she thinks she needs.   Pt initally asked if writer thinks patient needs to be \"locked up.\"   Answering in the negative and asking patient if she thinks she needs to be \"locked up,\" patient then seemed to relax and was willing to engage and talk about plan to help her command voices stop or lessen.  Pt says she was once 4.5 years clean.  She once attended  and says she does not want formal CD tx but is willing to return to  and reconnect with sponsor.  Pt has job and safe place to live currently    Brief Psychosocial History  Family:   , Children    Support System:  Sibling(s), Children, Partner  Employment Status:  employed full-time  Source of Income:  salary/wages  Financial Environmental Concerns:  No concerns identified  Current Hobbies:     Barriers in Personal Life:  behavioral concerns    Significant Clinical History  Current Anxiety Symptoms:  racing thoughts, " "excessive worry  Current Depression/Trauma:  impaired decision making  Current Somatic Symptoms:  racing thoughts, excessive worry, sweating, flushing, shaking  Current Psychosis/Thought Disturbance:  distractability, auditory hallucinations, displaces blame  Current Eating Symptoms:     Chemical Use History:  Alcohol: Daily  Last Use:: 09/28/23  Benzodiazepines: None  Opiates: None  Cocaine: None  Marijuana: None  Other Use: Methamphetamines  Last Use:: 09/27/23   Past diagnosis:  ADHD, Anxiety Disorder, Bipolar Disorder, Depression, PTSD, Personality Disorder  Family history:  No known history of mental health or chemical health concerns  Past treatment:  Individual therapy, Psychiatric Medication Management, Inpatient Hospitalization, Other  Details of most recent treatment:  Pt was MH IP in February then went to IP CD tx  Other relevant history:       Collateral Information  Is there collateral information: Yes     Collateral information name, relationship, phone number:  daughter earlier today; Friend Kerrie at current encounter in ED with pt     What happened today: Pt has been using meth, not consistent with medications, hearing voices     What is different about patient's functioning: Patient has had this presentation but wants voices to subside, says she wont ever use meth again, is tired of voices     Concern about alcohol/drug use:  yes, meth    What do you think the patient needs:  stop meth; med compliance    Has patient made comments about wanting to kill themselves/others:  (pt has made vague references about \"being done\" due to voices pt says she is hearing)    If d/c is recommended, can they take part in safety/aftercare planning:  yes    Additional collateral information:  friend Kerrie wants to follow pt course of treatment and is available to help; vasyl told Edwardo earlier that she is supportive but unsure how much tangible help she can currently provide pt.     Risk " Assessment  [place] Suicide Severity Rating Scale Full Clinical Version:  Suicidal Ideation  Q2 Non-Specific Active Suicidal Thoughts (Lifetime): Yes  Q6 Suicide Behavior (Lifetime): yes     Suicidal Behavior (Lifetime)  Actual Attempt (Lifetime): Yes  Has subject engaged in non-suicidal self-injurious behavior? (Lifetime): No  Interrupted Attempts (Lifetime): No  Aborted or Self-Interrupted Attempt (Lifetime): No  Preparatory Acts or Behavior (Lifetime): No    [place] Suicide Severity Rating Scale Recent:   Suicidal Ideation (Recent)  Q1 Wished to be Dead (Past Month): yes  Q2 Suicidal Thoughts (Past Month): yes  Q3 Suicidal Thought Method: no  Q4 Suicidal Intent without Specific Plan: no  Q5 Suicide Intent with Specific Plan: no  Within the Past 3 Months?: no  Level of Risk per Screen: high risk          Environmental or Psychosocial Events: impulsivity/recklessness, ongoing abuse of substances  Protective Factors: Protective Factors: reality testing ability, lives in a responsibly safe and stable environment, optimistic outlook - identification of future goals    Does the patient have thoughts of harming others? Feels Like Hurting Others: no  Previous Attempt to Hurt Others: no  Current presentation:  (intermittently irritable)  Is the patient engaging in sexually inappropriate behavior?: no    Is the patient engaging in sexually inappropriate behavior?  no        Mental Status Exam   Affect: Constricted  Appearance: Appropriate  Attention Span/Concentration: Attentive  Eye Contact: Variable    Fund of Knowledge: Appropriate   Language /Speech Content: Non-Fluent, Fluent  Language /Speech Volume: Normal  Language /Speech Rate/Productions: Normal, Minimally Responsive, Other (please comment) (variable; more fluent with time)  Recent Memory: Variable  Remote Memory: Variable  Mood: Irritable, Normal  Orientation to Person: Yes   Orientation to Place: Yes  Orientation to Time of Day: Yes  Orientation to Date: Yes   "   Situation (Do they understand why they are here?): Yes  Psychomotor Behavior: Normal  Thought Content: Clear  Thought Form: Intact     Mini-Cog Assessment  Number of Words Recalled:    Clock-Drawing Test:     Three Item Recall:    Mini-Cog Total Score:       Medication  Psychotropic medications:   Medication Orders - Psychiatric (From admission, onward)      Start     Dose/Rate Route Frequency Ordered Stop    09/30/23 1920  hydrOXYzine (ATARAX) tablet 25 mg         25 mg Oral AT BEDTIME PRN 09/30/23 1920 09/30/23 1920  LORazepam (ATIVAN) tablet 1 mg         1 mg Oral EVERY 8 HOURS PRN 09/30/23 1920               Current Care Team  Patient Care Team:  No Ref-Primary, Physician as PCP - General    Diagnosis  Patient Active Problem List   Diagnosis Code    CARDIOVASCULAR SCREENING; LDL GOAL LESS THAN 130 Z13.6    Bipolar 2 disorder (H) F31.81    Episodic mood disorder (H) F39    Methamphetamine use disorder, severe, dependence (H) F15.20    Personality disorder (H) F60.9    Generalized anxiety disorder F41.1    Panic disorder F41.0    PTSD (post-traumatic stress disorder) F43.10    Severe mixed bipolar I disorder with psychotic features (H) F31.64       Primary Problem This Admission  Active Hospital Problems    *Episodic mood disorder (H)      Methamphetamine use disorder, severe, dependence (H)        Clinical Summary and Substantiation of Recommendations   patient recently used meth, has been medication non-compliant and has not been sleeping.  Pt wants voices to stop.  Pt does not want to be \"locked up.\"  She does not want to go to CD tx again.  Pt can be stabilized in ED with medications, sleep and be reassessed.  Time and care in ED may allow patient's symptoms to subside making it safe for patient to discharge later in ED encounter.   Patient is pretty irritable about voices and about being in ED.  Pt has been IP MH several times.  She is currently not actively suicidal or homicidal.  She tried to leave " [facility] ED earlier and is not a particularly good candidate for IP MH at this time.  Medication, observation and ressessment is recommended for patient's symptoms and presentation for needs    Patient coping skills attempted to reduce the crisis:  Pt using meth recently; showing up in ED but initially less than cooperative.  patient did become cooperative sufficient to express needs and help with plan to address her current presenting symptoms.    Disposition  Recommended disposition: Observation        Reviewed case and recommendations with attending provider. Attending Name: Lionel Ferraro MD       Attending concurs with disposition: yes       Patient and/or validated legal guardian concurs with disposition:   yes       Final disposition:  observation    Legal status on admission:  voluntary     Assessment Details   Total duration spent on the patient case in minutes: 30 min     CPT code(s) utilized: 16929 - Psychotherapy for Crisis - 60 (30-74*) min    RD Brooks, Psychotherapist  DEC - Triage & Transition Services  Callback: [phone]

## 2023-10-01 NOTE — ED PROVIDER NOTES
History     Chief Complaint   Patient presents with    Suicidal     HPI  Alexa Vee is a 48 year old female who presents with her sister with complaints of hearing voices and the voices telling her to hurt herself.  Patient has a long history of mental illness and was hospitalized and in treatment for a really long time recently.  She states over the last month she stopped taking her medications because she was doing really well.  She did start taking them in 2 days ago but only in the morning.  She states that she just wants the voices to stop.  She denies a specific plan on how to hurt herself.  Denies any recent drug use.  Patient has not been sleeping the last few days.  Denies any recent fevers or chills.    Allergies:  Allergies   Allergen Reactions    Amoxicillin     Hydrocodone-Acetaminophen Hives    Hydromorphone Hives       Problem List:    Patient Active Problem List    Diagnosis Date Noted    Episodic mood disorder (H24) 07/23/2019     Priority: Medium    PTSD (post-traumatic stress disorder) 07/19/2019     Priority: Medium    Severe mixed bipolar I disorder with psychotic features (H) 07/19/2019     Priority: Medium    Personality disorder (H) 06/23/2019     Priority: Medium    Methamphetamine use disorder, severe, dependence (H) 01/08/2019     Priority: Medium     Formatting of this note might be different from the original.  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      Bipolar 2 disorder (H) 03/02/2018     Priority: Medium    Generalized anxiety disorder 03/02/2018     Priority: Medium    Panic disorder 01/24/2018     Priority: Medium    CARDIOVASCULAR SCREENING; LDL GOAL LESS THAN 130 06/26/2012     Priority: Medium        Past Medical History:    No past medical history on file.    Past Surgical History:    Past Surgical History:   Procedure Laterality Date    COLONOSCOPY N/A 11/16/2022    Procedure: COLONOSCOPY;  Surgeon: Wilmer Farris MD;  Location: [facility]  History:    Family History   Problem Relation Age of Onset    Cerebrovascular Disease Mother     Hypertension Mother     Cerebrovascular Disease Maternal Grandmother     Cerebrovascular Disease Maternal Grandfather     Hypertension Maternal Aunt        Social History:  Marital Status:   [4]  Social History     Tobacco Use    Smoking status: Every Day     Packs/day: 0.50     Types: Cigarettes    Smokeless tobacco: Never   Vaping Use    Vaping Use: Never used   Substance Use Topics    Alcohol use: Yes     Comment: rare    Drug use: Not Currently     Types: Methamphetamines        Medications:    levothyroxine (SYNTHROID/LEVOTHROID) 150 MCG tablet  metFORMIN (GLUCOPHAGE) 500 MG tablet  OLANZapine (ZYPREXA) 10 MG tablet  prazosin (MINIPRESS) 1 MG capsule  VITAMIN D3 50 MCG (2000 UT) tablet  VYVANSE 30 MG capsule  ARIPiprazole (ABILIFY) 15 MG tablet  bisacodyl (DULCOLAX) 5 MG EC tablet  buPROPion (WELLBUTRIN XL) 300 MG 24 hr tablet  cloNIDine (CATAPRES) 0.1 MG tablet  ibuprofen (ADVIL/MOTRIN) 600 MG tablet  levonorgestrel (MIRENA) 20 MCG/24HR IUD  polyethylene glycol (GOLYTELY) 236 g suspension  triamcinolone (KENALOG) 0.1 % paste          Review of Systems   All other systems reviewed and are negative.      Physical Exam   BP: 101/75  Pulse: 98  Temp: 97.3  F (36.3  C)  Resp: 18  Weight: 80.5 kg (177 lb 8 oz)  SpO2: 98 %      Physical Exam  Vitals and nursing note reviewed.   Constitutional:       General: She is not in acute distress.     Appearance: She is well-developed. She is not diaphoretic.   HENT:      Head: Normocephalic and atraumatic.      Nose: Nose normal.      Mouth/Throat:      Pharynx: No oropharyngeal exudate.   Eyes:      Conjunctiva/sclera: Conjunctivae normal.   Cardiovascular:      Rate and Rhythm: Normal rate and regular rhythm.      Heart sounds: Normal heart sounds. No murmur heard.     No friction rub.   Pulmonary:      Effort: Pulmonary effort is normal. No respiratory distress.       Breath sounds: Normal breath sounds. No stridor. No wheezing or rales.   Abdominal:      General: Bowel sounds are normal. There is no distension.      Palpations: Abdomen is soft. There is no mass.      Tenderness: There is no abdominal tenderness. There is no guarding.   Musculoskeletal:         General: No tenderness. Normal range of motion.      Cervical back: Normal range of motion and neck supple.   Skin:     General: Skin is warm and dry.      Capillary Refill: Capillary refill takes less than 2 seconds.      Findings: No erythema.   Neurological:      Mental Status: She is alert and oriented to person, place, and time.   Psychiatric:         Judgment: Judgment normal.         ED Course              Results for orders placed or performed during the hospital encounter of 09/30/23 (from the past 24 hour(s))   CBC with platelets differential    Narrative    The following orders were created for panel order CBC with platelets differential.  Procedure                               Abnormality         Status                     ---------                               -----------         ------                     CBC with platelets and d...[[id number]]                      Final result                 Please view results for these tests on the individual orders.   Comprehensive metabolic panel   Result Value Ref Range    Sodium 138 135 - 145 mmol/L    Potassium 3.6 3.4 - 5.3 mmol/L    Carbon Dioxide (CO2) 24 22 - 29 mmol/L    Anion Gap 11 7 - 15 mmol/L    Urea Nitrogen 15.7 6.0 - 20.0 mg/dL    Creatinine 0.79 0.51 - 0.95 mg/dL    GFR Estimate >90 >60 mL/min/1.73m2    Calcium 8.9 8.6 - 10.0 mg/dL    Chloride 103 98 - 107 mmol/L    Glucose 98 70 - 99 mg/dL    Alkaline Phosphatase 53 35 - 104 U/L    AST 15 0 - 45 U/L    ALT 14 0 - 50 U/L    Protein Total 5.7 (L) 6.4 - 8.3 g/dL    Albumin 3.5 3.5 - 5.2 g/dL    Bilirubin Total 0.2 <=1.2 mg/dL   TSH with free T4 reflex   Result Value Ref Range    TSH 5.63 (H) 0.30 - 4.20  uIU/mL   Acetaminophen level   Result Value Ref Range    Acetaminophen <5.0 (L) 10.0 - 30.0 ug/mL   Salicylate level   Result Value Ref Range    Salicylate <0.3   mg/dL   CBC with platelets and differential   Result Value Ref Range    WBC Count 5.9 4.0 - 11.0 10e3/uL    RBC Count 4.46 3.80 - 5.20 10e6/uL    Hemoglobin 13.5 11.7 - 15.7 g/dL    Hematocrit 40.2 35.0 - 47.0 %    MCV 90 78 - 100 fL    MCH 30.3 26.5 - 33.0 pg    MCHC 33.6 31.5 - 36.5 g/dL    RDW 13.5 10.0 - 15.0 %    Platelet Count 180 150 - 450 10e3/uL    % Neutrophils 57 %    % Lymphocytes 34 %    % Monocytes 6 %    % Eosinophils 3 %    % Basophils 0 %    % Immature Granulocytes 0 %    NRBCs per 100 WBC 0 <1 /100    Absolute Neutrophils 3.3 1.6 - 8.3 10e3/uL    Absolute Lymphocytes 2.0 0.8 - 5.3 10e3/uL    Absolute Monocytes 0.4 0.0 - 1.3 10e3/uL    Absolute Eosinophils 0.2 0.0 - 0.7 10e3/uL    Absolute Basophils 0.0 0.0 - 0.2 10e3/uL    Absolute Immature Granulocytes 0.0 <=0.4 10e3/uL    Absolute NRBCs 0.0 10e3/uL   T4 free   Result Value Ref Range    Free T4 0.92 0.90 - 1.70 ng/dL       Medications   ibuprofen (ADVIL/MOTRIN) tablet 600 mg (has no administration in time range)   LORazepam (ATIVAN) tablet 1 mg (has no administration in time range)   melatonin tablet 3 mg (has no administration in time range)   hydrOXYzine (ATARAX) tablet 25 mg (has no administration in time range)   OLANZapine (zyPREXA) tablet 10 mg (10 mg Oral $Given 9/30/23 2206)     Labs were reviewed and were mostly unremarkable.  Patient has a history of hypothyroidism and states she has not taken her meds in a while.  Patient's TSH was elevated but free T4 was normal.  Patient appears to have a some subclinical hypothyroidism.  Do not think that this is likely the cause of her mood instability changes.  Patient is medically stable.  Patient was seen by the DEC and assessed.  Patient admitted to them that she did recently use methamphetamines again which she was in treatment for,  she used about 2 days ago.  I think some of her increased symptoms of auditory hallucinations and command hallucinations are likely from this.  They do not think that she is holdable at this time and do not think that we should pursue inpatient evaluation.  Patient has not been sleeping well and with her bipolar they are recommending observation in the ER overnight, patient can get some sleep overnight and have the patient reassessed in the morning.  The thought is that if patient can sleep, her mental status may clear and could be safely discharged tomorrow.  We will have the patient plan to be reassessed in the morning.  Patient will be signed out at change of shift to Dr. Kang to follow-up on this repeat assessment and disposition.    Assessments & Plan (with Medical Decision Making)  Bipolar disorder with psychotic features, methamphetamine abuse     I have reviewed the nursing notes.    I have reviewed the findings, diagnosis, plan and need for follow up with the patient.        9/30/2023   [facility] EMERGENCY DEPT       Lionel Ferraro MD  10/01/23 0609

## (undated) DEVICE — TUBING SUCTION 12"X1/4" N612

## (undated) DEVICE — KIT ENDO TURNOVER/PROCEDURE CARRY-ON 101822

## (undated) DEVICE — SOL WATER IRRIG 1000ML BOTTLE 07139-09

## (undated) DEVICE — GLOVE EXAM NITRILE LG

## (undated) DEVICE — LUBRICATING JELLY 4.25OZ